# Patient Record
Sex: MALE | Race: BLACK OR AFRICAN AMERICAN | NOT HISPANIC OR LATINO | Employment: OTHER | URBAN - METROPOLITAN AREA
[De-identification: names, ages, dates, MRNs, and addresses within clinical notes are randomized per-mention and may not be internally consistent; named-entity substitution may affect disease eponyms.]

---

## 2017-10-31 ENCOUNTER — HOSPITAL ENCOUNTER (EMERGENCY)
Facility: OTHER | Age: 72
Discharge: HOME | End: 2017-10-31
Attending: EMERGENCY MEDICINE
Payer: MEDICARE

## 2017-10-31 VITALS
DIASTOLIC BLOOD PRESSURE: 63 MMHG | BODY MASS INDEX: 21.2 KG/M2 | HEART RATE: 84 BPM | HEIGHT: 73 IN | TEMPERATURE: 98 F | RESPIRATION RATE: 18 BRPM | OXYGEN SATURATION: 99 % | WEIGHT: 160 LBS | SYSTOLIC BLOOD PRESSURE: 120 MMHG

## 2017-10-31 DIAGNOSIS — Z71.1 PERSON WITH FEARED COMPLAINT, NO DIAGNOSIS MADE: Primary | ICD-10-CM

## 2017-10-31 LAB
ANION GAP SERPL CALC-SCNC: 7 MMOL/L
BASOPHILS # BLD AUTO: 0.02 K/UL
BASOPHILS NFR BLD: 0.2 %
BUN SERPL-MCNC: 20 MG/DL
CALCIUM SERPL-MCNC: 9.2 MG/DL
CHLORIDE SERPL-SCNC: 102 MMOL/L
CO2 SERPL-SCNC: 30 MMOL/L
CREAT SERPL-MCNC: 0.8 MG/DL
DIFFERENTIAL METHOD: ABNORMAL
EOSINOPHIL # BLD AUTO: 0 K/UL
EOSINOPHIL NFR BLD: 0.1 %
ERYTHROCYTE [DISTWIDTH] IN BLOOD BY AUTOMATED COUNT: 12.7 %
EST. GFR  (AFRICAN AMERICAN): >60 ML/MIN/1.73 M^2
EST. GFR  (NON AFRICAN AMERICAN): >60 ML/MIN/1.73 M^2
GLUCOSE SERPL-MCNC: 106 MG/DL
HCT VFR BLD AUTO: 38 %
HGB BLD-MCNC: 12.2 G/DL
LYMPHOCYTES # BLD AUTO: 1.2 K/UL
LYMPHOCYTES NFR BLD: 9.9 %
MCH RBC QN AUTO: 32.3 PG
MCHC RBC AUTO-ENTMCNC: 32.1 G/DL
MCV RBC AUTO: 101 FL
MONOCYTES # BLD AUTO: 0.9 K/UL
MONOCYTES NFR BLD: 7.3 %
NEUTROPHILS # BLD AUTO: 10.2 K/UL
NEUTROPHILS NFR BLD: 81.9 %
PLATELET # BLD AUTO: 301 K/UL
PMV BLD AUTO: 9.1 FL
POTASSIUM SERPL-SCNC: 4.3 MMOL/L
RBC # BLD AUTO: 3.78 M/UL
SODIUM SERPL-SCNC: 139 MMOL/L
WBC # BLD AUTO: 12.39 K/UL

## 2017-10-31 PROCEDURE — 80048 BASIC METABOLIC PNL TOTAL CA: CPT

## 2017-10-31 PROCEDURE — 93005 ELECTROCARDIOGRAM TRACING: CPT

## 2017-10-31 PROCEDURE — 93010 ELECTROCARDIOGRAM REPORT: CPT | Mod: ,,, | Performed by: INTERNAL MEDICINE

## 2017-10-31 PROCEDURE — 99284 EMERGENCY DEPT VISIT MOD MDM: CPT | Mod: 25

## 2017-10-31 PROCEDURE — 85025 COMPLETE CBC W/AUTO DIFF WBC: CPT

## 2017-10-31 NOTE — ED TRIAGE NOTES
Patient stated that last week he was walking around and then woke up on the ground.  Patient denies passing out and does not remember how he got on the floor.  Patient stated that he never felt dizzy or weak.  Patient stated he is only here because his family made him come get checked out.

## 2017-10-31 NOTE — ED PROVIDER NOTES
Encounter Date: 10/31/2017    SCRIBE #1 NOTE: I, Karina Montana , am scribing for, and in the presence of, Dr. Zarate .       History     Chief Complaint   Patient presents with    Fall     Pt reports falling last Friday and relatives want him checked out, denies LOC, no obvious trauma, reports NO pain at this time     Time seen by provider: 6:21 PM    This is a 72 y.o. male who presents to the ED s/p fall. Pt recalls struggling to stand after waking up on the floor four days ago, but is unsure of how he got there. The patient reports chronic vision changes to the right eye, but denies fever, chills, nausea, vomiting, blood in stool, palpitations, gait problem, dizziness, light-headedness, back pain, neck pain, or any urinary symptoms.       The history is provided by the patient.     Review of patient's allergies indicates:  No Known Allergies  Past Medical History:   Diagnosis Date    Anxiety 10/5/2014    Arthritis 10/5/2014    Chronic back pain 11/26/2015    Elevated PSA 11/7/2012    Fatigue 11/7/2012    GERD (gastroesophageal reflux disease) 10/5/2014    Iron deficiency anemia, unspecified 11/7/2012    Neurofibromatosis, type 1 (von Recklinghausen's disease) 11/7/2012    Prostate cancer 11/7/2012    Slow transit constipation 4/24/2016     Past Surgical History:   Procedure Laterality Date    RECTAL POLYPECTOMY  2011     History reviewed. No pertinent family history.  Social History   Substance Use Topics    Smoking status: Never Smoker    Smokeless tobacco: Not on file    Alcohol use No     Review of Systems   Constitutional: Negative for activity change, appetite change, chills, diaphoresis and fever.   HENT: Negative for congestion, sore throat and trouble swallowing.    Eyes: Positive for visual disturbance (chronic ). Negative for photophobia.   Respiratory: Negative for cough, chest tightness and shortness of breath.    Cardiovascular: Negative for chest pain and palpitations.    Gastrointestinal: Negative for abdominal pain, blood in stool, nausea and vomiting.   Endocrine: Negative for polydipsia, polyphagia and polyuria.   Genitourinary: Negative for decreased urine volume, difficulty urinating, dysuria, flank pain, frequency, hematuria and urgency.   Musculoskeletal: Negative for back pain, gait problem and neck pain.   Skin: Negative for pallor.   Neurological: Negative for dizziness, weakness, light-headedness and headaches.   Psychiatric/Behavioral: Negative for confusion.       Physical Exam     Initial Vitals [10/31/17 1756]   BP Pulse Resp Temp SpO2   117/65 98 17 98.4 °F (36.9 °C) 99 %      MAP       82.33         Physical Exam    Nursing note and vitals reviewed.  Constitutional: He appears well-developed and well-nourished. He is not diaphoretic. No distress.   HENT:   Head: Normocephalic and atraumatic.   Right Ear: External ear normal.   Left Ear: External ear normal.   Eyes: Right eye exhibits no discharge. Left eye exhibits no discharge.   No conjunctival pallor. Right eye is cloudy.    Neck: Normal range of motion. Neck supple. No tracheal deviation present.   Cardiovascular: Normal rate, regular rhythm, normal heart sounds and intact distal pulses. Exam reveals no gallop and no friction rub.    No murmur heard.  Pulmonary/Chest: Breath sounds normal. No stridor. No respiratory distress. He has no wheezes. He has no rhonchi. He has no rales.   Abdominal: Soft. Bowel sounds are normal. He exhibits no distension. There is no tenderness. There is no rebound and no guarding.   Musculoskeletal: Normal range of motion. He exhibits no edema or tenderness.   Neurological: He is alert and oriented to person, place, and time. He has normal strength. No cranial nerve deficit.   Skin: Skin is warm and dry. Capillary refill takes less than 2 seconds. No erythema. No pallor.   Extensive evidence of neurofibromatosis. Skin lesions.    Psychiatric: He has a normal mood and affect.  Thought content normal.         ED Course   Procedures  Labs Reviewed   CBC W/ AUTO DIFFERENTIAL - Abnormal; Notable for the following:        Result Value    RBC 3.78 (*)     Hemoglobin 12.2 (*)     Hematocrit 38.0 (*)      (*)     MCH 32.3 (*)     MPV 9.1 (*)     Gran # 10.2 (*)     Gran% 81.9 (*)     Lymph% 9.9 (*)     All other components within normal limits   BASIC METABOLIC PANEL - Abnormal; Notable for the following:     CO2 30 (*)     Anion Gap 7 (*)     All other components within normal limits     EKG Readings: (Independently Interpreted)   Initial Reading: No STEMI.   Normal sinus rhythm at a rate of 90 bpm. Normal axis.           Medical Decision Making:   Initial Assessment:   Urgent evaluation of 73 yo M with neurofibramatosis and extensive skin nodules here for evaluation on encouragement of family after awakening on the floor 4 days previously. Pt reports associated weakness with struggle to ambulate thereafter, but improved to baseline quickly. Since that time pt has had no complaints, no dizziness, blood in stool, difficulty walking/speaking, vomiting or headache. On exam pt has no evidence of trauma, no tongue laceration to suggest seizure, and nml neurologic exam w chronic unchanged R eye opacity w light perception at baseline. Broad ddx w low suspicion for serious pathology or IC injury/mass, will eval for anemia, electrolyte disturbance and likely dc home w fu PCP.   Clinical Tests:   Lab Tests: Ordered and Reviewed  Medical Tests: Ordered and Reviewed                   ED Course      Clinical Impression:     1. Person with feared complaint, no diagnosis made          Disposition:   Disposition: Discharged  Condition: Stable                        Savannah Zarate MD  10/31/17 2038

## 2017-11-13 ENCOUNTER — PATIENT OUTREACH (OUTPATIENT)
Dept: INTERNAL MEDICINE | Facility: CLINIC | Age: 72
End: 2017-11-13

## 2017-11-13 NOTE — PROGRESS NOTES
[facility] is committed to your overall health.  To help you get the most out of each of your visits, we will review your information to make sure you are up to date on all of your recommended tests and/or procedures.       Your PCP  Raphael Catherine MD   found that you may be due for:     Health Maintenance Due   Topic Date Due    Zoster Vaccine  08/17/2003    Pneumococcal (65+) (1 of 2 - PCV13) 08/17/2008    Influenza Vaccine  08/01/2017    PROSTATE-SPECIFIC ANTIGEN  10/03/2017    Lipid Panel  10/03/2017     Medicare does not cover all immunizations to be given in the clinic. Check your benefits to ensure that you do not need to receive your immunizations at the pharmacy.  You are more than welcome to visit our pharmacy here on campus to receive your vaccinations on the same day of your upcoming scheduled visit.             If you have had any of the above done at another facility, please bring the records or information with you so that your record at [facility] will be complete.  If you would like to schedule any of these, please contact me.     If you are currently taking medication, please bring it with you to your appointment for review.     Also, if you have any type of Advanced Directives, please bring them with you to your office visit so we may scan them into your chart.     Thank you for Choosing [facility] for your healthcare needs.      Additional Information  If you have questions, you can email [email or web address] or call [phone]  to talk to our [facility] staff. Remember, [facility] is NOT to be used for urgent needs. For medical emergencies, dial 911.

## 2017-12-05 ENCOUNTER — OFFICE VISIT (OUTPATIENT)
Dept: INTERNAL MEDICINE | Facility: CLINIC | Age: 72
End: 2017-12-05
Attending: INTERNAL MEDICINE
Payer: MEDICARE

## 2017-12-05 ENCOUNTER — HOSPITAL ENCOUNTER (OUTPATIENT)
Dept: RADIOLOGY | Facility: OTHER | Age: 72
Discharge: HOME | End: 2017-12-05
Attending: INTERNAL MEDICINE
Payer: MEDICARE

## 2017-12-05 VITALS
HEART RATE: 73 BPM | BODY MASS INDEX: 20.37 KG/M2 | WEIGHT: 153.69 LBS | DIASTOLIC BLOOD PRESSURE: 66 MMHG | HEIGHT: 73 IN | SYSTOLIC BLOOD PRESSURE: 130 MMHG

## 2017-12-05 DIAGNOSIS — R55 SYNCOPE AND COLLAPSE: ICD-10-CM

## 2017-12-05 DIAGNOSIS — R97.20 ELEVATED PSA: Primary | ICD-10-CM

## 2017-12-05 PROCEDURE — 93880 EXTRACRANIAL BILAT STUDY: CPT | Mod: TC

## 2017-12-05 PROCEDURE — 70450 CT HEAD/BRAIN W/O DYE: CPT | Mod: 26,,, | Performed by: RADIOLOGY

## 2017-12-05 PROCEDURE — 93880 EXTRACRANIAL BILAT STUDY: CPT | Mod: 26,,, | Performed by: RADIOLOGY

## 2017-12-05 PROCEDURE — 70450 CT HEAD/BRAIN W/O DYE: CPT | Mod: TC

## 2017-12-05 PROCEDURE — 99999 PR PBB SHADOW E&M-EST. PATIENT-LVL IV: CPT | Mod: PBBFAC,,, | Performed by: INTERNAL MEDICINE

## 2017-12-05 PROCEDURE — 99214 OFFICE O/P EST MOD 30 MIN: CPT | Mod: S$GLB,,, | Performed by: INTERNAL MEDICINE

## 2017-12-05 NOTE — PROGRESS NOTES
Subjective:       Patient ID: Ishan Moulton is a 72 y.o. male.    Chief Complaint: Hospital Follow Up    Here for ED f/u    Recent syncope event. Pt denies any symptoms prior to going to bed that evening and feeling well. The next morning he awoke on the floor with weakness of lower body and had to use his upper body to pull himself off the floor. He does not describe focal weakness of legs but simply difficulty getting off the floor. He denies any confusion up awakening and knew where he was but has no recollection of awakening and attempting to get out of bed. He denies loss of bowel or bladder. He denies any focal weakness, numbness/tingling, tremors, HA, neck pain, chronic neck pain, prior episodes of dizziness, recent CP, recent palpitations, fatigue, F/C, abd pain, blurry vision, vertigo, recent GI illness. Symptoms have completed resolved and have not reoccured. He denies any new symptoms since event and feels well. He consumes very little water throughout the day. He denies EtOH use.  He reports he has a high bed and occasionally will accidentally slide downout of bed when getting attempting to get down. Takes B12 , but no Hx of B12 deficiency.          Review of Systems   Constitutional: Negative for appetite change, chills, fever and unexpected weight change.   HENT: Negative for hearing loss, sore throat and trouble swallowing.    Eyes: Negative for visual disturbance.   Respiratory: Negative for cough, chest tightness and shortness of breath.    Cardiovascular: Negative for chest pain and leg swelling.   Gastrointestinal: Negative for abdominal pain, blood in stool, constipation, diarrhea, nausea and vomiting.   Endocrine: Negative for polydipsia and polyuria.   Genitourinary: Negative for decreased urine volume, difficulty urinating, dysuria, frequency and urgency.   Musculoskeletal: Negative for gait problem.   Skin: Negative for rash.   Neurological: Negative for dizziness and numbness.  "  Psychiatric/Behavioral: The patient is not nervous/anxious.        Objective:      Vitals:    12/05/17 0847   BP: 130/66   Pulse: 73   Weight: 69.7 kg (153 lb 10.6 oz)   Height: 6' 1" (1.854 m)      Physical Exam   Constitutional: He is oriented to person, place, and time. He appears well-developed and well-nourished. No distress.   HENT:   Head: Normocephalic and atraumatic.   Mouth/Throat: Oropharynx is clear and moist. No oropharyngeal exudate.   Eyes: Conjunctivae and EOM are normal. Pupils are equal, round, and reactive to light. No scleral icterus.   Neck: No thyromegaly present.   Cardiovascular: Normal rate, regular rhythm and normal heart sounds.    No murmur heard.  Pulses:       Carotid pulses are 2+ on the right side, and 2+ on the left side.  Pulmonary/Chest: Effort normal and breath sounds normal. He has no wheezes. He has no rales.   Abdominal: Soft. He exhibits no distension. There is no tenderness.   Musculoskeletal: He exhibits no edema or tenderness.   Lymphadenopathy:     He has no cervical adenopathy.   Neurological: He is alert and oriented to person, place, and time. He has normal strength. No cranial nerve deficit (right eye not evaluated) or sensory deficit. He displays a negative Romberg sign. Gait normal.   Negative pronator drift   Skin: Skin is warm and dry.   Psychiatric: He has a normal mood and affect. His behavior is normal.       Assessment:       1. Elevated PSA    2. Syncope and collapse        Plan:       Ishan was seen today for hospital follow up.    Diagnoses and all orders for this visit:    Elevated PSA  -     PROSTATE SPECIFIC ANTIGEN, DIAGNOSTIC; Future    Syncope and collapse  -syncopal event while getting out of bed? TIA can not be ruled out. Arrhythmia can not be ruled out.  -     Radiology US Carotid Bilateral; Future  -     2D echo with color flow doppler; Future  -     CT Head Without Contrast; Future  -     Ambulatory Referral to Cardiology    Return after " testing and cardiac eval             Side effects of medication(s) were discussed in detail and patient voiced understanding.  Patient will call back for any issues or complications.

## 2017-12-06 ENCOUNTER — TELEPHONE (OUTPATIENT)
Dept: INTERNAL MEDICINE | Facility: CLINIC | Age: 72
End: 2017-12-06

## 2017-12-06 DIAGNOSIS — R97.20 PSA ELEVATION: ICD-10-CM

## 2017-12-06 DIAGNOSIS — R55 SYNCOPE, UNSPECIFIED SYNCOPE TYPE: Primary | ICD-10-CM

## 2017-12-06 NOTE — TELEPHONE ENCOUNTER
Please let pt know his PSA remains elevated at 5.8 and that he needs to schedule f/u with his urologist Dr. Mario Alberto Rivera and if okay with him let's fax those results to his urologist.  Also in regards to his recent visit I would like for him to see neurology for additional evaluation of his recent event.

## 2017-12-06 NOTE — TELEPHONE ENCOUNTER
Pt informed of results and advice.  States verbal understanding. Pt appt slips mailed. Patient has no further questions or concerns.  Pt states he no longer wants to see Dr. Rivera.   Pt request to see internal urologist. Pt scheduled accordingly. Please sign pended order.

## 2017-12-12 ENCOUNTER — HOSPITAL ENCOUNTER (OUTPATIENT)
Dept: CARDIOLOGY | Facility: CLINIC | Age: 72
Discharge: HOME | End: 2017-12-12
Attending: INTERNAL MEDICINE
Payer: MEDICARE

## 2017-12-12 DIAGNOSIS — R55 SYNCOPE AND COLLAPSE: ICD-10-CM

## 2017-12-12 LAB
DIASTOLIC DYSFUNCTION: NO
ESTIMATED PA SYSTOLIC PRESSURE: 17.14
MITRAL VALVE REGURGITATION: NORMAL
RETIRED EF AND QEF - SEE NOTES: 60 (ref 55–65)
TRICUSPID VALVE REGURGITATION: NORMAL

## 2017-12-12 PROCEDURE — 93306 TTE W/DOPPLER COMPLETE: CPT | Mod: S$GLB,,, | Performed by: INTERNAL MEDICINE

## 2017-12-14 ENCOUNTER — TELEPHONE (OUTPATIENT)
Dept: INTERNAL MEDICINE | Facility: CLINIC | Age: 72
End: 2017-12-14

## 2018-01-04 ENCOUNTER — TELEPHONE (OUTPATIENT)
Dept: INTERNAL MEDICINE | Facility: CLINIC | Age: 73
End: 2018-01-04

## 2018-01-04 ENCOUNTER — OFFICE VISIT (OUTPATIENT)
Dept: UROLOGY | Facility: CLINIC | Age: 73
End: 2018-01-04
Attending: INTERNAL MEDICINE
Payer: MEDICARE

## 2018-01-04 VITALS
BODY MASS INDEX: 19.72 KG/M2 | HEART RATE: 90 BPM | WEIGHT: 148.81 LBS | SYSTOLIC BLOOD PRESSURE: 106 MMHG | HEIGHT: 73 IN | DIASTOLIC BLOOD PRESSURE: 64 MMHG

## 2018-01-04 DIAGNOSIS — R97.20 ELEVATED PSA: Primary | ICD-10-CM

## 2018-01-04 LAB
BILIRUB SERPL-MCNC: NORMAL MG/DL
BLOOD URINE, POC: NORMAL
COLOR, POC UA: YELLOW
GLUCOSE UR QL STRIP: NORMAL
KETONES UR QL STRIP: NORMAL
LEUKOCYTE ESTERASE URINE, POC: NORMAL
NITRITE, POC UA: NORMAL
PH, POC UA: 6
POC RESIDUAL URINE VOLUME: 64 ML (ref 0–100)
PROTEIN, POC: NORMAL
SPECIFIC GRAVITY, POC UA: 1
UROBILINOGEN, POC UA: NORMAL

## 2018-01-04 PROCEDURE — 81002 URINALYSIS NONAUTO W/O SCOPE: CPT | Mod: S$GLB,,, | Performed by: UROLOGY

## 2018-01-04 PROCEDURE — 51798 US URINE CAPACITY MEASURE: CPT | Mod: S$GLB,,, | Performed by: UROLOGY

## 2018-01-04 PROCEDURE — 99204 OFFICE O/P NEW MOD 45 MIN: CPT | Mod: 25,S$GLB,, | Performed by: UROLOGY

## 2018-01-04 NOTE — PROGRESS NOTES
"Subjective:      Ishan Moulton is a 72 y.o. male who was referred by Raphael Catherine MD  for evaluation of elevated PSA.      Elevated PSA  Patient is here with an elevated PSA. He has no family history of prostate cancer. His AUA Symptom Score is 9/2. He has no prior genitourinary history of hematuria, prostatitis, UTI.    The following portions of the patient's history were reviewed and updated as appropriate: allergies, current medications, past family history, past medical history, past social history, past surgical history and problem list.    Review of Systems  Constitutional: no fever or chills  ENT: no nasal congestion or sore throat  Respiratory: no cough or shortness of breath  Cardiovascular: no chest pain or palpitations  Gastrointestinal: no nausea or vomiting, tolerating diet  Genitourinary: as per HPI  Hematologic/Lymphatic: no easy bruising or lymphadenopathy  Musculoskeletal: no arthralgias or myalgias  Neurological: no seizures or tremors  Behavioral/Psych: no auditory or visual hallucinations     Objective:   Vitals: /64 (BP Location: Left arm, Patient Position: Sitting, BP Method: Large (Automatic))   Pulse 90   Ht 6' 1" (1.854 m)   Wt 67.5 kg (148 lb 13 oz)   BMI 19.63 kg/m²     Physical Exam   General: alert and oriented, no acute distress  Head: normocephalic, atraumatic  Neck: supple, no lymphadenopathy, normal ROM, no masses  Respiratory: Symmetric expansion, non-labored breathing  Cardiovascular: regular rate and rhythm, nomal pulses, no peripheral edema  Neuro: alert and oriented x3, no gross deficits  Psych: normal judgment and insight, normal mood/affect and non-anxious    Bladder Scan PVR: 64cc      Lab Review   Urinalysis demonstrates negative for all components  Lab Results   Component Value Date    WBC 12.39 10/31/2017    HGB 12.2 (L) 10/31/2017    HCT 38.0 (L) 10/31/2017     (H) 10/31/2017     10/31/2017     Lab Results   Component Value Date    CREATININE " 0.8 10/31/2017    BUN 20 10/31/2017     Component Total PSA Free PSA % Free PSA   Latest Ref Rng & Units < OR = 4.0 ng/mL ng/mL >25 % (calc)   12/5/2017 5.8 (H)     10/3/2016 6.1 (H) 1.5 25 (L)   3/22/2016 6.5 (H) 1.4 22 (L)   3/27/2015 5.4 (H) 1.4 26   3/10/2014 4.3 (H)     4/17/2013 6.8 (H)         Assessment:     1. Elevated PSA        Plan:   Discussed PSA with patient and reviewed above history. Overall, PSA has been stable since 2013, mostly between 5.4 and 6.8, with high % free PSA. As such, risk for malignancy and in particular high grade malignancy is relatively low. Therefore I think biopsy can be deferred at this time in favor of continued observation with repeat PSA in 6 mos. He agrees to plan.    I spent over 45 minutes with the patient. Over 50% of the visit was spent in counseling and coordination of care.

## 2018-01-04 NOTE — TELEPHONE ENCOUNTER
----- Message from Rachel España sent at 1/3/2018  3:06 PM CST -----  Contact: self  x_  1st Request  _  2nd Request  _  3rd Request    Who: pt    Why: pt returning call in regards to referral appt..please advise    What Number to Call Back: [phone]    When to Expect a call back: (Before the end of the day)   -- if call after 3:00 call back will be tomorrow.

## 2018-01-09 ENCOUNTER — OFFICE VISIT (OUTPATIENT)
Dept: CARDIOLOGY | Facility: CLINIC | Age: 73
End: 2018-01-09
Payer: MEDICARE

## 2018-01-09 VITALS
BODY MASS INDEX: 20.28 KG/M2 | HEIGHT: 72 IN | DIASTOLIC BLOOD PRESSURE: 66 MMHG | WEIGHT: 149.69 LBS | SYSTOLIC BLOOD PRESSURE: 129 MMHG | HEART RATE: 79 BPM

## 2018-01-09 DIAGNOSIS — R55 SYNCOPE, UNSPECIFIED SYNCOPE TYPE: Primary | ICD-10-CM

## 2018-01-09 DIAGNOSIS — Q85.00 NEUROFIBROMATOSIS (NONMALIGNANT): ICD-10-CM

## 2018-01-09 PROCEDURE — 99204 OFFICE O/P NEW MOD 45 MIN: CPT | Mod: S$GLB,,, | Performed by: INTERNAL MEDICINE

## 2018-01-09 PROCEDURE — 99999 PR PBB SHADOW E&M-EST. PATIENT-LVL III: CPT | Mod: PBBFAC,,, | Performed by: INTERNAL MEDICINE

## 2018-01-09 NOTE — LETTER
January 9, 2018      Raphael Catherine MD  [address]  [address]  [facility] [address]           [facility]  [address]  [place] [address]  Phone: [phone]          Patient: Ishan Moulton   MR Number: [id number]   YOB: 1945   Date of Visit: 1/9/2018       Dear Dr. Raphael Catherine:    Thank you for referring Ishan Moulton to me for evaluation. Attached you will find relevant portions of my assessment and plan of care.    If you have questions, please do not hesitate to call me. I look forward to following Ishan Moulton along with you.    Sincerely,    Geremias Manzano MD    Enclosure  CC:  No Recipients    If you would like to receive this communication electronically, please contact [email or web address] or [phone] to request more information on [facility] access.    For providers and/or their staff who would like to refer a patient to [facility], please contact us through our one-stop-shop provider referral line, [facility] , at [phone].    If you feel you have received this communication in error or would no longer like to receive these types of communications, please e-mail [email or web address]

## 2018-01-15 ENCOUNTER — OFFICE VISIT (OUTPATIENT)
Dept: NEUROLOGY | Facility: CLINIC | Age: 73
End: 2018-01-15
Attending: INTERNAL MEDICINE
Payer: MEDICARE

## 2018-01-15 VITALS
BODY MASS INDEX: 20.1 KG/M2 | DIASTOLIC BLOOD PRESSURE: 67 MMHG | SYSTOLIC BLOOD PRESSURE: 103 MMHG | HEIGHT: 72 IN | HEART RATE: 106 BPM | WEIGHT: 148.38 LBS

## 2018-01-15 DIAGNOSIS — R55 SYNCOPE, UNSPECIFIED SYNCOPE TYPE: ICD-10-CM

## 2018-01-15 DIAGNOSIS — F41.9 ANXIETY: ICD-10-CM

## 2018-01-15 DIAGNOSIS — R55 TRANSIENT LOSS OF CONSCIOUSNESS: Primary | ICD-10-CM

## 2018-01-15 DIAGNOSIS — Q85.01 NEUROFIBROMATOSIS, TYPE 1 (VON RECKLINGHAUSEN'S DISEASE): ICD-10-CM

## 2018-01-15 PROCEDURE — 99999 PR PBB SHADOW E&M-EST. PATIENT-LVL III: CPT | Mod: PBBFAC,,, | Performed by: PSYCHIATRY & NEUROLOGY

## 2018-01-15 PROCEDURE — 99204 OFFICE O/P NEW MOD 45 MIN: CPT | Mod: S$GLB,,, | Performed by: PSYCHIATRY & NEUROLOGY

## 2018-01-15 NOTE — PATIENT INSTRUCTIONS
The patient history of syncope may be related to orthostatic hypotension.  He does report that he does not drink enough fluid and past would skip breakfast in the mornings.  A CT scan of the brain and carotid ultrasound done showed age-related changes but was otherwise unremarkable.  Will get an EEG done to rule out the remote possibility of a paroxysmal disorder.  If this is unremarkable he will continue follow-up with his primary care physician.  His blood pressures have been running low and recently and his blood pressure today was 103/67.  Adequate hydration and avoiding skipping breakfast is recommended.

## 2018-01-22 ENCOUNTER — HOSPITAL ENCOUNTER (OUTPATIENT)
Dept: NEUROLOGY | Facility: CLINIC | Age: 73
Discharge: HOME | End: 2018-01-22
Payer: MEDICARE

## 2018-01-22 DIAGNOSIS — R55 TRANSIENT LOSS OF CONSCIOUSNESS: ICD-10-CM

## 2018-01-22 DIAGNOSIS — R55 SYNCOPE, UNSPECIFIED SYNCOPE TYPE: ICD-10-CM

## 2018-01-22 PROCEDURE — 95816 PR EEG,W/AWAKE & DROWSY RECORD: ICD-10-PCS | Mod: S$GLB,,, | Performed by: PSYCHIATRY & NEUROLOGY

## 2018-01-22 PROCEDURE — 95816 EEG AWAKE AND DROWSY: CPT | Mod: S$GLB,,, | Performed by: PSYCHIATRY & NEUROLOGY

## 2018-01-23 NOTE — PROCEDURES
DATE OF STUDY:  01/22/2018    EEG NUMBER:  OC-.    REFERRING PHYSICIAN:  Danyel Patterson M.D.    This EEG was performed to assess for evidence of underlying epilepsy.    ELECTROENCEPHALOGRAM REPORT    METHODOLOGY:  Electroencephalographic (EEG) recording is recorded with   electrodes placed according to the International 10-20 placement system.  Thirty   two (32) channels of digital signal (sampling rate of 512/sec), including T1   and T2, were simultaneously recorded from the scalp and may include EKG, EMG,   and/or eye monitors.  Recording band pass was 0.1 to 512 Hz.  Digital video   recording of the patient is simultaneously recorded with the EEG.  The patient   is instructed to report clinical symptoms which may occur during the recording   session.  EEG and video recording are stored and archived in digital format.    Activation procedures, which include photic stimulation, hyperventilation and   instructing patients to perform simple tasks, are done in selected patients.    The EEG is displayed on a monitor screen and can be reviewed using different   montages.  Computer assisted-analysis is employed to detect spike and   electrographic seizure activity.  The entire record is submitted for computer   analysis.  The entire recording is visually reviewed, and the times identified   by computer analysis as being spikes or seizures are reviewed again.    Compressed spectral analysis (CSA) is also performed on the activity recorded   from each individual channel.  This is displayed as a power display of   frequencies from 0 to 30 Hz over time.  The CSA is reviewed looking for   asymmetries in power between homologous areas of the scalp, then compared with   the original EEG recording.    [employer] software was also utilized in the review of this study.  This software   suite analyzes the EEG recording in multiple domains.  Coherence and rhythmicity   are computed to identify EEG sections which may contain  organized seizures.    Each channel undergoes analysis to detect the presence of spike and sharp waves   which have special and morphological characteristics of epileptic activity.  The   routine EEG recording is converted from special into frequency domain.  This is   then displayed comparing homologous areas to identify areas of significant   asymmetry.  Algorithm to identify non-cortically generated artifact is used to   separate artifact from the EEG.    EEG FINDINGS:  The recording was obtained with a number of standard bipolar and   referential montages during wakefulness and drowsiness.  In the alert state, the   posterior background rhythm was a symmetric, well-modulated 9 Hz to 10 Hz alpha   rhythm, which reacted symmetrically to eye opening.  Intermittent photic   stimulation evoked symmetric posterior driving responses.  Hyperventilation was   not performed.  No abnormalities were activated by photic stimulation.  During   drowsiness, the background rhythm waxed and waned and there were periods of   slowing.  Independent left and right temporal focal polymorphic delta and theta   range slowing was noted, worse on the left.  No sleep was recorded.  There were   no interictal epileptiform abnormalities and no clinical or electrographic   seizures were recorded.    The EKG channel revealed sinus rhythm.    IMPRESSION:  This is an abnormal EEG during wakefulness and drowsiness.    Independent left and right temporal focal slowing was noted.    CLINICAL CORRELATION:  The patient is a 72-year-old male who is being evaluated   for a history of syncope.  This is an abnormal EEG during wakefulness and   drowsiness.  The presence of independent left and right temporal focal slowing   is suggestive of focal neuronal dysfunction in these regions.  There is no   evidence of an epileptic process on this recording.  No seizures were recorded   during this study.  The EKG channel revealed sinus rhythm.      FAK/IN  dd:  01/23/2018 09:06:05 (CST)  td: 01/23/2018 09:40:57 (CST)  Doc ID   #[id number]  Job ID #[id number]    CC:

## 2019-02-04 ENCOUNTER — HOSPITAL ENCOUNTER (EMERGENCY)
Facility: OTHER | Age: 74
Discharge: HOME | End: 2019-02-04
Attending: EMERGENCY MEDICINE
Payer: MEDICARE

## 2019-02-04 VITALS
TEMPERATURE: 98 F | SYSTOLIC BLOOD PRESSURE: 120 MMHG | RESPIRATION RATE: 16 BRPM | WEIGHT: 145 LBS | HEART RATE: 87 BPM | BODY MASS INDEX: 19.22 KG/M2 | DIASTOLIC BLOOD PRESSURE: 59 MMHG | HEIGHT: 73 IN | OXYGEN SATURATION: 100 %
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
## 2019-02-04 DIAGNOSIS — S01.112A LACERATION OF LEFT EYEBROW, INITIAL ENCOUNTER: Primary | ICD-10-CM

## 2019-02-04 PROCEDURE — 25000003 PHARM REV CODE 250: Performed by: PHYSICIAN ASSISTANT

## 2019-02-04 PROCEDURE — 63600175 PHARM REV CODE 636 W HCPCS: Performed by: PHYSICIAN ASSISTANT

## 2019-02-04 PROCEDURE — 90471 IMMUNIZATION ADMIN: CPT | Performed by: PHYSICIAN ASSISTANT

## 2019-02-04 PROCEDURE — 90715 TDAP VACCINE 7 YRS/> IM: CPT | Performed by: PHYSICIAN ASSISTANT

## 2019-02-04 PROCEDURE — 12011 RPR F/E/E/N/L/M 2.5 CM/<: CPT

## 2019-02-04 PROCEDURE — 99284 EMERGENCY DEPT VISIT MOD MDM: CPT | Mod: 25

## 2019-02-04 RX ORDER — LIDOCAINE HYDROCHLORIDE AND EPINEPHRINE 10; 10 MG/ML; UG/ML
10 INJECTION, SOLUTION INFILTRATION; PERINEURAL
Status: COMPLETED | OUTPATIENT
Start: 2019-02-04 | End: 2019-02-04

## 2019-02-04 RX ADMIN — LIDOCAINE HYDROCHLORIDE,EPINEPHRINE BITARTRATE 10 ML: 10; .01 INJECTION, SOLUTION INFILTRATION; PERINEURAL at 10:02

## 2019-02-04 RX ADMIN — CLOSTRIDIUM TETANI TOXOID ANTIGEN (FORMALDEHYDE INACTIVATED), CORYNEBACTERIUM DIPHTHERIAE TOXOID ANTIGEN (FORMALDEHYDE INACTIVATED), BORDETELLA PERTUSSIS TOXOID ANTIGEN (GLUTARALDEHYDE INACTIVATED), BORDETELLA PERTUSSIS FILAMENTOUS HEMAGGLUTININ ANTIGEN (FORMALDEHYDE INACTIVATED), BORDETELLA PERTUSSIS PERTACTIN ANTIGEN, AND BORDETELLA PERTUSSIS FIMBRIAE 2/3 ANTIGEN 0.5 ML: 5; 2; 2.5; 5; 3; 5 INJECTION, SUSPENSION INTRAMUSCULAR at 10:02

## 2019-02-04 NOTE — ED NOTES
Pt to ED, reporting slipping on uneven carpet, reporting he hit above his L eyelid. Pt with approx 2.5 cm laceration above L upper eyelid, near distal eyebrow. No active bleeding. Reporting incident occurred last PM around 8 pm. Denies LOC, unsure if he is UTD on tetanus vaccine. Pt AAOx4 and appropriate at this time. Respirations even and unlabored. No acute distress noted.

## 2019-02-04 NOTE — ED PROVIDER NOTES
Encounter Date: 2/4/2019       History     Chief Complaint   Patient presents with    Laceration     Pt states that he tripped and fell causing a lac above his left eye. He denies any LOC.      Patient is a 73 y.o. male with a past medical history of neurofibromatosis, presenting to the emergency department with complaints of laceration to his left eyebrow.  The patient states that yesterday evening at approximately 8:00 p.m. he was walking when he tripped on some carpet and fell forward.  He states that he cut his left eyebrow.  He reports he has some bleeding and 1 of the nurses that charge tried to put some tape on it.  He denies any changes in his vision.  He denies any loss of consciousness.  No nausea or vomiting. He states he is not up-to-date on tetanus.  No medication use thus far. This is the extent of the patient's complaints at this time.         The history is provided by the patient.     Review of patient's allergies indicates:  No Known Allergies  Past Medical History:   Diagnosis Date    Anxiety 10/5/2014    Arthritis 10/5/2014    Chronic back pain 11/26/2015    Elevated PSA 11/7/2012    Fatigue 11/7/2012    GERD (gastroesophageal reflux disease) 10/5/2014    Iron deficiency anemia, unspecified 11/7/2012    Neurofibromatosis, type 1 (von Recklinghausen's disease) 11/7/2012    Prostate cancer 11/7/2012    Slow transit constipation 4/24/2016     Past Surgical History:   Procedure Laterality Date    RECTAL POLYPECTOMY  2011     History reviewed. No pertinent family history.  Social History     Tobacco Use    Smoking status: Never Smoker    Smokeless tobacco: Never Used   Substance Use Topics    Alcohol use: No    Drug use: Not on file     Review of Systems   Constitutional: Negative for activity change, chills, fatigue and fever.   HENT: Negative for congestion, rhinorrhea and sore throat.    Eyes: Negative for photophobia and visual disturbance.   Respiratory: Negative for cough and  shortness of breath.    Cardiovascular: Negative for chest pain.   Gastrointestinal: Negative for abdominal pain, diarrhea, nausea and vomiting.   Genitourinary: Negative for dysuria, hematuria and urgency.   Musculoskeletal: Negative for back pain, myalgias and neck pain.   Skin: Positive for wound. Negative for color change.   Neurological: Negative for weakness and headaches.   Psychiatric/Behavioral: Negative for agitation and confusion.       Physical Exam     Initial Vitals [02/04/19 1028]   BP Pulse Resp Temp SpO2   (!) 129/59 92 18 98.1 °F (36.7 °C) 100 %      MAP       --         Physical Exam    Nursing note and vitals reviewed.  Constitutional: Vital signs are normal. He appears well-developed and well-nourished. He is not diaphoretic.  Non-toxic appearance. He does not have a sickly appearance. He does not appear ill. No distress.   Well-appearing,  male unaccompanied in the emergency department.  Speaking clearly in full sentences.  No acute distress.   HENT:   Head: Normocephalic and atraumatic.       Right Ear: External ear normal.   Left Ear: External ear normal.   Nose: Nose normal.   Mouth/Throat: Oropharynx is clear and moist.   Ecchymosis noted to the left upper eyelid with no tenderness to palpation. Normal extraocular movement.   Diffuse, neurofibromatosis tumors to the face   Eyes: Conjunctivae and EOM are normal.   Neck: Normal range of motion. Neck supple.   Musculoskeletal: Normal range of motion.   Neurological: He is alert and oriented to person, place, and time. No cranial nerve deficit or sensory deficit. GCS eye subscore is 4. GCS verbal subscore is 5. GCS motor subscore is 6.   Skin: Skin is warm.   Psychiatric: He has a normal mood and affect. His behavior is normal. Judgment and thought content normal.         ED Course   Lac Repair  Date/Time: 2/4/2019 11:53 AM  Performed by: Rosita France PA-C  Authorized by: Ira Huntley MD   Body area: head/neck  Location  details: left eyebrow  Laceration length: 2 cm  Tendon involvement: none  Nerve involvement: none  Skin closure: glue and Steri-Strips  Technique: simple  Approximation: close  Patient tolerance: Patient tolerated the procedure well with no immediate complications        Labs Reviewed - No data to display       Imaging Results          CT Head Without Contrast (Final result)  Result time 02/04/19 13:12:18    Final result by Imtiaz Harrison MD (02/04/19 13:12:18)                 Impression:      1. No acute intracranial abnormality identified.  2. Age-related mild senescent changes as above.  3. Right thalamic suspected remote lacunar type infarct, unchanged.  4. Left periorbital soft tissue swelling/contusion, without maxillofacial acute displaced fracture-dislocation identified.  5. 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  6. Innumerable subcutaneous nodules consistent with neurofibromatosis type 1.  7. Numerous scattered small lytic appearing foci throughout the cervical spine.  If clinical concern for osseous lytic metastases, further evaluation with elective/nonemergent bone scan can be obtained as warranted in the management of this patient.      Electronically signed by: Imtiaz Harrison MD  Date:    02/04/2019  Time:    13:12             Narrative:    EXAMINATION:  CT HEAD WITHOUT CONTRAST; CT MAXILLOFACIAL WITHOUT CONTRAST    CLINICAL HISTORY:  Maxface trauma blunt;    TECHNIQUE:  Low dose axial CT images obtained throughout the head and maxillofacial region without intravenous contrast. Sagittal and coronal reconstructions were performed.    COMPARISON:  Head CT 12/05/2017    FINDINGS:  Head CT:    Intracranial compartment:    Age-appropriate generalized cerebral volume loss.  Ventricles are midline and stable in size configuration without distortion  by mass effect or acute hydrocephalus.  No extra-axial blood or fluid collections.    Grossly stable distribution of supratentorial white matter chronic small vessel ischemic change.  Right thalamic suspected remote lacunar type infarct, unchanged.  No definite new focal areas of abnormal parenchymal attenuation.  Stable small nonspecific parenchymal calcification within the left cerebellum.  No parenchymal mass, hemorrhage, edema or major vascular distribution infarct.  Calcific atherosclerosis of the bilateral cavernous ICAs.    Skull/extracranial contents (limited evaluation): No displaced skull fracture.    Maxillofacial CT: Interval left periorbital mild soft tissue swelling/contusion.    The greater wing of the sphenoid bone on the right and also posterior aspect of the right orbital roof appear to be absent similar to prior, which could be from dysplasia or prior resection.  There is abnormal increased soft tissue in the preseptal aspect of the right orbit as well as extending more posteriorly along the superior orbit suggestive of plexus form neurofibroma grossly similar prior.  Numerous small soft tissue nodules are seen diffusely over the scalp, face and neck consistent with widespread neurofibromas.  Remainder of the right orbit is otherwise intact.  Left orbit is within normal limits.  Probable prior surgery to the left ocular lens.  Both ocular lenses are anteriorly located.  Both globes appear symmetric and intact.  Scattered nonspecific dermal calcifications noted.    No acute displaced fracture-dislocation identified.  No subcutaneous emphysema or radiodense retained foreign body.    Multilevel age-related mild to moderate degenerative changes of the included cervical spine.  Multiple dental caries and several missing teeth noted.  Paranasal sinuses, middle ears and mastoid air cells are clear.    Numerous scattered small lytic appearing foci throughout the cervical spine.                                CT Maxillofacial Without Contrast (Final result)  Result time 02/04/19 13:12:18    Final result by Imtiaz Harrison MD (02/04/19 13:12:18)                 Impression:      1. No acute intracranial abnormality identified.  2. Age-related mild senescent changes as above.  3. Right thalamic suspected remote lacunar type infarct, unchanged.  4. Left periorbital soft tissue swelling/contusion, without maxillofacial acute displaced fracture-dislocation identified.  5. 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  6. Innumerable subcutaneous nodules consistent with neurofibromatosis type 1.  7. Numerous scattered small lytic appearing foci throughout the cervical spine.  If clinical concern for osseous lytic metastases, further evaluation with elective/nonemergent bone scan can be obtained as warranted in the management of this patient.      Electronically signed by: Imtiaz Harrison MD  Date:    02/04/2019  Time:    13:12             Narrative:    EXAMINATION:  CT HEAD WITHOUT CONTRAST; CT MAXILLOFACIAL WITHOUT CONTRAST    CLINICAL HISTORY:  Maxface trauma blunt;    TECHNIQUE:  Low dose axial CT images obtained throughout the head and maxillofacial region without intravenous contrast. Sagittal and coronal reconstructions were performed.    COMPARISON:  Head CT 12/05/2017    FINDINGS:  Head CT:    Intracranial compartment:    Age-appropriate generalized cerebral volume loss.  Ventricles are midline and stable in size configuration without distortion by mass effect or acute hydrocephalus.  No extra-axial blood or fluid collections.    Grossly stable distribution of supratentorial white matter chronic small vessel ischemic change.  Right thalamic suspected remote lacunar type infarct, unchanged.  No definite new focal areas of abnormal parenchymal attenuation.  Stable  small nonspecific parenchymal calcification within the left cerebellum.  No parenchymal mass, hemorrhage, edema or major vascular distribution infarct.  Calcific atherosclerosis of the bilateral cavernous ICAs.    Skull/extracranial contents (limited evaluation): No displaced skull fracture.    Maxillofacial CT: Interval left periorbital mild soft tissue swelling/contusion.    The greater wing of the sphenoid bone on the right and also posterior aspect of the right orbital roof appear to be absent similar to prior, which could be from dysplasia or prior resection.  There is abnormal increased soft tissue in the preseptal aspect of the right orbit as well as extending more posteriorly along the superior orbit suggestive of plexus form neurofibroma grossly similar prior.  Numerous small soft tissue nodules are seen diffusely over the scalp, face and neck consistent with widespread neurofibromas.  Remainder of the right orbit is otherwise intact.  Left orbit is within normal limits.  Probable prior surgery to the left ocular lens.  Both ocular lenses are anteriorly located.  Both globes appear symmetric and intact.  Scattered nonspecific dermal calcifications noted.    No acute displaced fracture-dislocation identified.  No subcutaneous emphysema or radiodense retained foreign body.    Multilevel age-related mild to moderate degenerative changes of the included cervical spine.  Multiple dental caries and several missing teeth noted.  Paranasal sinuses, middle ears and mastoid air cells are clear.    Numerous scattered small lytic appearing foci throughout the cervical spine.                                 Medical Decision Making:   Initial Assessment:   Urgent evaluation of a 73-year-old male with a past medical history of neurofibromatosis, presenting to the emergency department with complaints of laceration to the left upper eyelid.  Patient is afebrile, nontoxic appearing, hemodynamically stable. Physical exam  reveals a 2 cm laceration noted to the left superior orbit with no palpable deformity, crepitus, step-off.  No active bleeding.  Will plan for imaging, laceration care and possible repair.  Clinical Tests:   Radiological Study: Ordered and Reviewed  ED Management:  Wound was cleaned extensively in the emergency department.  It is over 12 hr old at this point, healing has started with poorly approximated edges.  I attempted to reapproximate them however eyes they were close I did not feel like re-laceration in the area.  Dermabond and Steri-Strips were applied per procedure note, the patient tolerated this well.  CT head shows no acute intracranial abnormality, age-related changes noted as well as some chronic baseline changes.  At this point, no further testing imaging is warranted.  Patient is counseled on symptomatic care and treatment as well as wound care.  He is stable for discharge home.The patient was instructed to follow up with a primary care provider in 2 days or to return to the emergency department for worsening symptoms. The treatment plan was discussed with the patient who demonstrated understanding and comfort with plan.    This note was created using [employer]. There may be typographical errors secondary to dictation.                         Clinical Impression:     1. Laceration of left eyebrow, initial encounter    2. Fall, initial encounter           Disposition:   Disposition: Discharged  Condition: Stable                        Rosita France PA-C  02/04/19 [id number]

## 2019-02-04 NOTE — ED NOTES
Appearance: Pt awake, alert & oriented to person, place & time. Pt in no acute distress at present time. Pt is clean and well groomed with clothes appropriately fastened.   Skin: Skin warm, dry . Color consistent with ethnicity. Mucous membranes moist. Pt with bruising to L upper eyelid with approx. 2.5 cm laceration, in which it appears closed shut. No bleeding from site. Pt denies pain or blurred vision at this time.   Musculoskeletal: Patient moving all extremities well, no obvious swelling or deformities noted.   Respiratory: Respirations spontaneous, even, and non-labored. Visible chest rise noted. Airway is open and patent. No accessory muscle use noted.   Neurologic: Sensation is intact. Speech is clear and appropriate. Eyes open spontaneously, behavior appropriate to situation, follows commands,  purposeful motor response noted.   Cardiac:  Pt denies active chest pains, SOB, dizziness, blurred vision, weakness or fatigue at this time.

## 2019-02-05 ENCOUNTER — TELEPHONE (OUTPATIENT)
Dept: ADMINISTRATIVE | Facility: HOSPITAL | Age: 74
End: 2019-02-05

## 2019-02-05 NOTE — TELEPHONE ENCOUNTER
Spoke to Mr. Moulton to schedule a follow up appt.  Patient confirmed date and time.  Instructed patient to call the office for any further questions or concerns.

## 2019-02-05 NOTE — TELEPHONE ENCOUNTER
Please contact patient schedule for annual visit has care gaps that needs to be addressed and ER f/u and referrals needed.

## 2019-02-05 NOTE — TELEPHONE ENCOUNTER
Patient was seen in ER for dx: laceration above the L eyebrow and s/p fall . Previous h/o fall . Please have office contact patient to schedule fu . Also , patient was seen last year by Neurology and unclear if he needs fu appt. I advised him of the need to schedule Urology fu as he was last seen DOS 1/4/2018 and RTC requested in 6 months r/t elevated PSA.     Please let me know if I can assist in any way.     Thanks ,  Leonardo Verma, RN   [facility] RN Navigator /   [facility]  Phone : [phone]

## 2019-02-06 ENCOUNTER — LAB VISIT (OUTPATIENT)
Dept: LAB | Facility: OTHER | Age: 74
End: 2019-02-06
Attending: INTERNAL MEDICINE
Payer: MEDICARE

## 2019-02-06 ENCOUNTER — OFFICE VISIT (OUTPATIENT)
Dept: INTERNAL MEDICINE | Facility: CLINIC | Age: 74
End: 2019-02-06
Attending: INTERNAL MEDICINE
Payer: MEDICARE

## 2019-02-06 VITALS
BODY MASS INDEX: 19.43 KG/M2 | SYSTOLIC BLOOD PRESSURE: 100 MMHG | HEIGHT: 73 IN | OXYGEN SATURATION: 99 % | DIASTOLIC BLOOD PRESSURE: 60 MMHG | WEIGHT: 146.63 LBS | HEART RATE: 83 BPM

## 2019-02-06 DIAGNOSIS — R79.9 ABNORMAL FINDING OF BLOOD CHEMISTRY: ICD-10-CM

## 2019-02-06 DIAGNOSIS — Z12.5 ENCOUNTER FOR SCREENING FOR MALIGNANT NEOPLASM OF PROSTATE: ICD-10-CM

## 2019-02-06 DIAGNOSIS — Z13.6 ENCOUNTER FOR LIPID SCREENING FOR CARDIOVASCULAR DISEASE: Primary | ICD-10-CM

## 2019-02-06 DIAGNOSIS — Z87.898 HISTORY OF ELEVATED PSA: ICD-10-CM

## 2019-02-06 DIAGNOSIS — M89.9 LYTIC BONE LESIONS ON XRAY: ICD-10-CM

## 2019-02-06 DIAGNOSIS — Z13.6 ENCOUNTER FOR LIPID SCREENING FOR CARDIOVASCULAR DISEASE: ICD-10-CM

## 2019-02-06 DIAGNOSIS — Z13.220 ENCOUNTER FOR LIPID SCREENING FOR CARDIOVASCULAR DISEASE: ICD-10-CM

## 2019-02-06 DIAGNOSIS — D49.89 NEOPLASM OF HEAD: ICD-10-CM

## 2019-02-06 DIAGNOSIS — Q85.01 NEUROFIBROMATOSIS, TYPE 1 (VON RECKLINGHAUSEN'S DISEASE): ICD-10-CM

## 2019-02-06 DIAGNOSIS — R23.4 CHANGES IN SKIN TEXTURE: ICD-10-CM

## 2019-02-06 DIAGNOSIS — R90.89 ABNORMAL CT OF BRAIN: ICD-10-CM

## 2019-02-06 DIAGNOSIS — M89.8X8 MASS OF SPINE: ICD-10-CM

## 2019-02-06 DIAGNOSIS — Z13.220 ENCOUNTER FOR LIPID SCREENING FOR CARDIOVASCULAR DISEASE: Primary | ICD-10-CM

## 2019-02-06 LAB
ALBUMIN SERPL BCP-MCNC: 3 G/DL
ALP SERPL-CCNC: 77 U/L
ALT SERPL W/O P-5'-P-CCNC: 10 U/L
ANION GAP SERPL CALC-SCNC: 10 MMOL/L
AST SERPL-CCNC: 11 U/L
BASOPHILS # BLD AUTO: 0.03 K/UL
BASOPHILS NFR BLD: 0.5 %
BILIRUB SERPL-MCNC: 0.7 MG/DL
BUN SERPL-MCNC: 15 MG/DL
CALCIUM SERPL-MCNC: 9 MG/DL
CHLORIDE SERPL-SCNC: 104 MMOL/L
CHOLEST SERPL-MCNC: 192 MG/DL
CHOLEST SERPL-MCNC: 192 MG/DL
CHOLEST/HDLC SERPL: 3 {RATIO}
CHOLEST/HDLC SERPL: 3 {RATIO}
CO2 SERPL-SCNC: 27 MMOL/L
COMPLEXED PSA SERPL-MCNC: 7 NG/ML
CREAT SERPL-MCNC: 0.9 MG/DL
DIFFERENTIAL METHOD: ABNORMAL
EOSINOPHIL # BLD AUTO: 0 K/UL
EOSINOPHIL NFR BLD: 0.7 %
ERYTHROCYTE [DISTWIDTH] IN BLOOD BY AUTOMATED COUNT: 13.5 %
EST. GFR  (AFRICAN AMERICAN): >60 ML/MIN/1.73 M^2
EST. GFR  (NON AFRICAN AMERICAN): >60 ML/MIN/1.73 M^2
ESTIMATED AVG GLUCOSE: 91 MG/DL
GLUCOSE SERPL-MCNC: 94 MG/DL
HBA1C MFR BLD HPLC: 4.8 %
HCT VFR BLD AUTO: 37.5 %
HDLC SERPL-MCNC: 64 MG/DL
HDLC SERPL-MCNC: 64 MG/DL
HDLC SERPL: 33.3 %
HDLC SERPL: 33.3 %
HGB BLD-MCNC: 11.5 G/DL
LDLC SERPL CALC-MCNC: 116.4 MG/DL
LDLC SERPL CALC-MCNC: 116.4 MG/DL
LYMPHOCYTES # BLD AUTO: 0.8 K/UL
LYMPHOCYTES NFR BLD: 15 %
MCH RBC QN AUTO: 31.1 PG
MCHC RBC AUTO-ENTMCNC: 30.7 G/DL
MCV RBC AUTO: 101 FL
MONOCYTES # BLD AUTO: 0.5 K/UL
MONOCYTES NFR BLD: 8.9 %
NEUTROPHILS # BLD AUTO: 4.1 K/UL
NEUTROPHILS NFR BLD: 74.7 %
NONHDLC SERPL-MCNC: 128 MG/DL
NONHDLC SERPL-MCNC: 128 MG/DL
PLATELET # BLD AUTO: 303 K/UL
PMV BLD AUTO: 9 FL
POTASSIUM SERPL-SCNC: 4 MMOL/L
PROT SERPL-MCNC: 7.7 G/DL
RBC # BLD AUTO: 3.7 M/UL
SODIUM SERPL-SCNC: 141 MMOL/L
TRIGL SERPL-MCNC: 58 MG/DL
TRIGL SERPL-MCNC: 58 MG/DL
TSH SERPL DL<=0.005 MIU/L-ACNC: 1.21 UIU/ML
WBC # BLD AUTO: 5.53 K/UL

## 2019-02-06 PROCEDURE — 99999 PR PBB SHADOW E&M-EST. PATIENT-LVL IV: ICD-10-PCS | Mod: PBBFAC,,, | Performed by: INTERNAL MEDICINE

## 2019-02-06 PROCEDURE — 83036 HEMOGLOBIN GLYCOSYLATED A1C: CPT

## 2019-02-06 PROCEDURE — 36415 COLL VENOUS BLD VENIPUNCTURE: CPT

## 2019-02-06 PROCEDURE — 99214 OFFICE O/P EST MOD 30 MIN: CPT | Mod: S$GLB,,, | Performed by: INTERNAL MEDICINE

## 2019-02-06 PROCEDURE — 1101F PR PT FALLS ASSESS DOC 0-1 FALLS W/OUT INJ PAST YR: ICD-10-PCS | Mod: CPTII,S$GLB,, | Performed by: INTERNAL MEDICINE

## 2019-02-06 PROCEDURE — 1101F PT FALLS ASSESS-DOCD LE1/YR: CPT | Mod: CPTII,S$GLB,, | Performed by: INTERNAL MEDICINE

## 2019-02-06 PROCEDURE — 85025 COMPLETE CBC W/AUTO DIFF WBC: CPT

## 2019-02-06 PROCEDURE — 99999 PR PBB SHADOW E&M-EST. PATIENT-LVL IV: CPT | Mod: PBBFAC,,, | Performed by: INTERNAL MEDICINE

## 2019-02-06 PROCEDURE — 99214 PR OFFICE/OUTPT VISIT, EST, LEVL IV, 30-39 MIN: ICD-10-PCS | Mod: S$GLB,,, | Performed by: INTERNAL MEDICINE

## 2019-02-06 PROCEDURE — 84153 ASSAY OF PSA TOTAL: CPT

## 2019-02-06 PROCEDURE — 80053 COMPREHEN METABOLIC PANEL: CPT

## 2019-02-06 PROCEDURE — 80061 LIPID PANEL: CPT

## 2019-02-06 PROCEDURE — 84443 ASSAY THYROID STIM HORMONE: CPT

## 2019-02-06 NOTE — PROGRESS NOTES
"Subjective:       Patient ID: Ishan Moulton is a 73 y.o. male.    Chief Complaint: Follow-up (hospita f/u due to fall )    Here for urgent visit    Seen 2 days prior in ED for laceration to forehead after a slip and fall over carpet. Had CT scan of head which revealed remote thalamic CVA  (unchanged but not read on prior) as well as numerous scattered small lytic appearing foci throughout the cervical spine. He has a Hx of elevated PSA that has been monitored. He has no other complaints than left shoulder pain that started up one day after his fall. He denies night sweats, neck pain, focal weakness, numbness/tingling, dizziness, tinnitus, dysphagia. He reports a little difficulty with balance when standing but once upright he is okay. He has loss 2 lbs over past year and 15lb down over past 2 years. Urinary frequency at baseline. No nocturia or gross hematuria.         Review of Systems   Constitutional: Negative for appetite change, chills, fever and unexpected weight change.   HENT: Negative for hearing loss, sore throat and trouble swallowing.    Eyes: Negative for visual disturbance.   Respiratory: Negative for cough, chest tightness and shortness of breath.    Cardiovascular: Negative for chest pain and leg swelling.   Gastrointestinal: Negative for abdominal pain, blood in stool, constipation, diarrhea, nausea and vomiting.   Endocrine: Negative for polydipsia and polyuria.   Genitourinary: Negative for decreased urine volume, difficulty urinating, dysuria, frequency and urgency.   Musculoskeletal: Negative for gait problem.   Skin: Negative for rash.   Neurological: Negative for dizziness and numbness.   Psychiatric/Behavioral: The patient is not nervous/anxious.        Objective:      Vitals:    02/06/19 1307   BP: 100/60   BP Location: Left arm   Patient Position: Sitting   BP Method: Small (Manual)   Pulse: 83   SpO2: 99%   Weight: 66.5 kg (146 lb 9.7 oz)   Height: 6' 1" (1.854 m)      Physical Exam "   Constitutional: He is oriented to person, place, and time. He appears well-developed and well-nourished. No distress.   HENT:   Head: Normocephalic and atraumatic.   Mouth/Throat: Oropharynx is clear and moist. No oropharyngeal exudate.   Eyes: Conjunctivae and EOM are normal. Pupils are equal, round, and reactive to light. No scleral icterus.   Neck: No thyromegaly present.   Cardiovascular: Normal rate, regular rhythm and normal heart sounds.   No murmur heard.  Pulmonary/Chest: Effort normal and breath sounds normal. He has no wheezes. He has no rales.   Abdominal: Soft. He exhibits no distension. There is no tenderness.   Musculoskeletal: He exhibits no edema or tenderness.   Lymphadenopathy:     He has no cervical adenopathy.   Neurological: He is alert and oriented to person, place, and time.   Skin: Skin is warm and dry.   Diffuse neurofibromatosis tumors     Psychiatric: He has a normal mood and affect. His behavior is normal.       Assessment:       1. Encounter for lipid screening for cardiovascular disease    2. Lytic bone lesions on xray    3. History of elevated PSA    4. Neurofibromatosis, type 1 (von Recklinghausen's disease)    5. Mass of spine    6. Encounter for screening for malignant neoplasm of prostate     7. Abnormal finding of blood chemistry     8. Changes in skin texture     9. Neoplasm of head    10. Abnormal CT of brain        Plan:       Ishan was seen today for follow-up.    Diagnoses and all orders for this visit:    Encounter for lipid screening for cardiovascular disease  -     Lipid panel; Future    Lytic bone lesions on xray  Will get updated PSA and bone scan to start. If primary unclear consider IR bone Bx for path.  -     Comprehensive metabolic panel; Future  -     Lipid panel; Future  -     TSH; Future  -     CBC auto differential; Future  -     Hemoglobin A1c; Future  -     NM Bone Scan Whole Body; Future    History of elevated PSA  -     PSA, Screening;  Future    Neurofibromatosis, type 1 (von Recklinghausen's disease)    Mass of spine  -     NM Bone Scan Whole Body; Future    Encounter for screening for malignant neoplasm of prostate   -     PSA, Screening; Future    Abnormal finding of blood chemistry   -     Lipid panel; Future  -     Hemoglobin A1c; Future    Changes in skin texture   -     TSH; Future    Abnormal CT of head  -     MRI Brain W WO Contrast; Future    RTC in 2 months or sooner prn                 Side effects of medication(s) were discussed in detail and patient voiced understanding.  Patient will call back for any issues or complications.

## 2019-02-07 ENCOUNTER — HOSPITAL ENCOUNTER (OUTPATIENT)
Dept: RADIOLOGY | Facility: OTHER | Age: 74
Discharge: HOME | End: 2019-02-07
Attending: INTERNAL MEDICINE
Payer: MEDICARE

## 2019-02-07 ENCOUNTER — TELEPHONE (OUTPATIENT)
Dept: INTERNAL MEDICINE | Facility: CLINIC | Age: 74
End: 2019-02-07

## 2019-02-07 DIAGNOSIS — R63.4 UNINTENTIONAL WEIGHT LOSS: ICD-10-CM

## 2019-02-07 DIAGNOSIS — D49.59 NEOPLASM OF PROSTATE: ICD-10-CM

## 2019-02-07 DIAGNOSIS — M89.9 LYTIC BONE LESIONS ON XRAY: ICD-10-CM

## 2019-02-07 DIAGNOSIS — M89.9 LYTIC BONE LESIONS ON XRAY: Primary | ICD-10-CM

## 2019-02-07 DIAGNOSIS — D64.9 ANEMIA, UNSPECIFIED TYPE: ICD-10-CM

## 2019-02-07 PROCEDURE — 74177 CT ABD & PELVIS W/CONTRAST: CPT | Mod: 26,,, | Performed by: RADIOLOGY

## 2019-02-07 PROCEDURE — 71260 CT THORAX DX C+: CPT | Mod: 26,,, | Performed by: RADIOLOGY

## 2019-02-07 PROCEDURE — 74177 CT CHEST ABDOMEN PELVIS WITH CONTRAST (XPD): ICD-10-PCS | Mod: 26,,, | Performed by: RADIOLOGY

## 2019-02-07 PROCEDURE — 25500020 PHARM REV CODE 255: Performed by: INTERNAL MEDICINE

## 2019-02-07 PROCEDURE — 71260 CT CHEST ABDOMEN PELVIS WITH CONTRAST (XPD): ICD-10-PCS | Mod: 26,,, | Performed by: RADIOLOGY

## 2019-02-07 PROCEDURE — 74177 CT ABD & PELVIS W/CONTRAST: CPT | Mod: TC

## 2019-02-07 RX ADMIN — IOHEXOL 75 ML: 350 INJECTION, SOLUTION INTRAVENOUS at 04:02

## 2019-02-07 RX ADMIN — IOHEXOL 30 ML: 350 INJECTION, SOLUTION INTRAVENOUS at 05:02

## 2019-02-07 NOTE — TELEPHONE ENCOUNTER
Please cancel nuclear bone scan for tomorrow. Please schedule CT scan with contrast ASAP. Pt aware and expecting phone call.

## 2019-02-08 ENCOUNTER — TELEPHONE (OUTPATIENT)
Dept: INTERNAL MEDICINE | Facility: CLINIC | Age: 74
End: 2019-02-08

## 2019-02-08 NOTE — TELEPHONE ENCOUNTER
Spoke to Mr. Andersons and informed him that once the DrStephanie Review his CT results we will get in touch with him,  Patient states understanding with no further questions.

## 2019-02-12 ENCOUNTER — TELEPHONE (OUTPATIENT)
Dept: INTERNAL MEDICINE | Facility: CLINIC | Age: 74
End: 2019-02-12

## 2019-02-12 DIAGNOSIS — D49.2 MUSCULOSKELETAL TUMOR: ICD-10-CM

## 2019-02-13 NOTE — TELEPHONE ENCOUNTER
Contacted Mr. Moulton and notified him about the  Bone scan appointment that we have scheduled for  2/21/19 at 8:00 am. Appointment letter mailed out today.  Cecilia

## 2019-02-21 ENCOUNTER — HOSPITAL ENCOUNTER (OUTPATIENT)
Dept: RADIOLOGY | Facility: OTHER | Age: 74
Discharge: HOME | End: 2019-02-21
Attending: INTERNAL MEDICINE
Payer: MEDICARE

## 2019-02-21 ENCOUNTER — TELEPHONE (OUTPATIENT)
Dept: INTERNAL MEDICINE | Facility: CLINIC | Age: 74
End: 2019-02-21

## 2019-02-21 DIAGNOSIS — M89.9 LYTIC BONE LESIONS ON XRAY: ICD-10-CM

## 2019-02-21 DIAGNOSIS — M89.8X8 MASS OF SPINE: ICD-10-CM

## 2019-02-21 PROCEDURE — 78306 NM BONE SCAN WHOLE BODY: ICD-10-PCS | Mod: 26,,, | Performed by: RADIOLOGY

## 2019-02-21 PROCEDURE — 78306 BONE IMAGING WHOLE BODY: CPT | Mod: 26,,, | Performed by: RADIOLOGY

## 2019-02-21 PROCEDURE — A9503 TC99M MEDRONATE: HCPCS

## 2019-02-21 NOTE — TELEPHONE ENCOUNTER
Please let pt know his bone scan is overall normal and does not show any signs of cancer or spread of cancer. No additional testing at this time and will discuss further at our f/u appt

## 2019-02-21 NOTE — TELEPHONE ENCOUNTER
Pt. Notified that his bone scan is overall normal, and does not show any signs of cancer or spread of cancer. No additional testing at this time and will discuss further at our f/u appt.   The patient verbalized understanding and had no further questions or concerns.  Cecilia

## 2019-02-22 ENCOUNTER — HOSPITAL ENCOUNTER (OUTPATIENT)
Dept: RADIOLOGY | Facility: OTHER | Age: 74
Discharge: HOME | End: 2019-02-22
Attending: INTERNAL MEDICINE
Payer: MEDICARE

## 2019-02-22 DIAGNOSIS — D49.89 NEOPLASM OF HEAD: ICD-10-CM

## 2019-02-22 PROCEDURE — 70553 MRI BRAIN W WO CONTRAST: ICD-10-PCS | Mod: 26,,, | Performed by: RADIOLOGY

## 2019-02-22 PROCEDURE — 70553 MRI BRAIN STEM W/O & W/DYE: CPT | Mod: 26,,, | Performed by: RADIOLOGY

## 2019-02-22 PROCEDURE — A9585 GADOBUTROL INJECTION: HCPCS | Performed by: INTERNAL MEDICINE

## 2019-02-22 PROCEDURE — 25500020 PHARM REV CODE 255: Performed by: INTERNAL MEDICINE

## 2019-02-22 PROCEDURE — 70553 MRI BRAIN STEM W/O & W/DYE: CPT | Mod: TC

## 2019-02-22 RX ORDER — GADOBUTROL 604.72 MG/ML
7 INJECTION INTRAVENOUS
Status: COMPLETED | OUTPATIENT
Start: 2019-02-22 | End: 2019-02-22

## 2019-02-22 RX ADMIN — GADOBUTROL 7 ML: 604.72 INJECTION INTRAVENOUS at 09:02

## 2019-05-25 NOTE — TELEPHONE ENCOUNTER
Please let patient know that overall his heart ultrasound looks good except for one small area. It may be an insignificant, non contributing finding, but to determine this I would like for him to discuss further with a cardiologist (referral already ordered) Please schedule.  
Pt informed of results and advice.  States verbal understanding.  Appt scheduled and mailed to pt. Patient has no further questions or concerns.    
supervision

## 2019-07-03 ENCOUNTER — TELEPHONE (OUTPATIENT)
Dept: INTERNAL MEDICINE | Facility: CLINIC | Age: 74
End: 2019-07-03

## 2019-07-03 NOTE — TELEPHONE ENCOUNTER
----- Message from Ade Hylton sent at 7/3/2019  3:25 PM CDT -----  Contact: Dr. Garcia- Brother  Name of Who is Calling: JENY GARCIA [[id number]]      What is the request in detail: pt would like the results of his test from February. Please contact to further discuss and advise.     Can the clinic reply by [facility]: N       [facility]: [phone]

## 2019-07-15 ENCOUNTER — TELEPHONE (OUTPATIENT)
Dept: INTERNAL MEDICINE | Facility: CLINIC | Age: 74
End: 2019-07-15

## 2019-07-16 ENCOUNTER — TELEPHONE (OUTPATIENT)
Dept: INTERNAL MEDICINE | Facility: CLINIC | Age: 74
End: 2019-07-16

## 2019-07-16 NOTE — TELEPHONE ENCOUNTER
----- Message from Terese Littlejohn sent at 7/16/2019  3:28 PM CDT -----  Contact: pt's brother  mike bryan [phone]  Type: Patient Call Back    Who called:pt's brother  mike bryan [phone]    What is the request in detail:pt's brother wants to discuss test results. Call back    Can the clinic reply by [facility]?    Would the patient rather a call back or a response via [facility]? Call back    Best call back number:pt's brother  mike bryan [phone]  Additional Information:

## 2019-07-18 ENCOUNTER — TELEPHONE (OUTPATIENT)
Dept: INTERNAL MEDICINE | Facility: CLINIC | Age: 74
End: 2019-07-18

## 2019-07-23 ENCOUNTER — LAB VISIT (OUTPATIENT)
Dept: LAB | Facility: OTHER | Age: 74
End: 2019-07-23
Attending: INTERNAL MEDICINE
Payer: MEDICARE

## 2019-07-23 ENCOUNTER — OFFICE VISIT (OUTPATIENT)
Dept: INTERNAL MEDICINE | Facility: CLINIC | Age: 74
End: 2019-07-23
Attending: INTERNAL MEDICINE
Payer: MEDICARE

## 2019-07-23 VITALS
SYSTOLIC BLOOD PRESSURE: 132 MMHG | BODY MASS INDEX: 20.74 KG/M2 | WEIGHT: 156.5 LBS | HEART RATE: 73 BPM | HEIGHT: 73 IN | OXYGEN SATURATION: 97 % | DIASTOLIC BLOOD PRESSURE: 70 MMHG

## 2019-07-23 DIAGNOSIS — R97.20 ELEVATED PSA: ICD-10-CM

## 2019-07-23 DIAGNOSIS — Z86.73 HISTORY OF CARDIOEMBOLIC CEREBROVASCULAR ACCIDENT (CVA): ICD-10-CM

## 2019-07-23 DIAGNOSIS — E53.8 B12 DEFICIENCY: ICD-10-CM

## 2019-07-23 DIAGNOSIS — R53.82 CHRONIC FATIGUE: ICD-10-CM

## 2019-07-23 DIAGNOSIS — D64.9 ANEMIA, UNSPECIFIED TYPE: ICD-10-CM

## 2019-07-23 DIAGNOSIS — R97.20 ELEVATED PSA: Primary | ICD-10-CM

## 2019-07-23 DIAGNOSIS — Q85.00 NEUROFIBROMATOSIS (NONMALIGNANT): ICD-10-CM

## 2019-07-23 LAB
ALBUMIN SERPL BCP-MCNC: 3.8 G/DL (ref 3.5–5.2)
ALP SERPL-CCNC: 83 U/L (ref 55–135)
ALT SERPL W/O P-5'-P-CCNC: 11 U/L (ref 10–44)
ANION GAP SERPL CALC-SCNC: 9 MMOL/L (ref 8–16)
AST SERPL-CCNC: 12 U/L (ref 10–40)
BASOPHILS # BLD AUTO: 0.03 K/UL (ref 0–0.2)
BASOPHILS NFR BLD: 0.5 % (ref 0–1.9)
BILIRUB SERPL-MCNC: 0.5 MG/DL (ref 0.1–1)
BUN SERPL-MCNC: 18 MG/DL (ref 8–23)
CALCIUM SERPL-MCNC: 9.4 MG/DL (ref 8.7–10.5)
CHLORIDE SERPL-SCNC: 104 MMOL/L (ref 95–110)
CO2 SERPL-SCNC: 28 MMOL/L (ref 23–29)
CREAT SERPL-MCNC: 1 MG/DL (ref 0.5–1.4)
DIFFERENTIAL METHOD: ABNORMAL
EOSINOPHIL # BLD AUTO: 0.1 K/UL (ref 0–0.5)
EOSINOPHIL NFR BLD: 1.1 % (ref 0–8)
ERYTHROCYTE [DISTWIDTH] IN BLOOD BY AUTOMATED COUNT: 13 % (ref 11.5–14.5)
EST. GFR  (AFRICAN AMERICAN): >60 ML/MIN/1.73 M^2
EST. GFR  (NON AFRICAN AMERICAN): >60 ML/MIN/1.73 M^2
GLUCOSE SERPL-MCNC: 83 MG/DL (ref 70–110)
HCT VFR BLD AUTO: 41.8 % (ref 40–54)
HGB BLD-MCNC: 12.8 G/DL (ref 14–18)
IMM GRANULOCYTES # BLD AUTO: 0.01 K/UL (ref 0–0.04)
IMM GRANULOCYTES NFR BLD AUTO: 0.2 % (ref 0–0.5)
LYMPHOCYTES # BLD AUTO: 1 K/UL (ref 1–4.8)
LYMPHOCYTES NFR BLD: 18.7 % (ref 18–48)
MCH RBC QN AUTO: 31.7 PG (ref 27–31)
MCHC RBC AUTO-ENTMCNC: 30.6 G/DL (ref 32–36)
MCV RBC AUTO: 104 FL (ref 82–98)
MONOCYTES # BLD AUTO: 0.7 K/UL (ref 0.3–1)
MONOCYTES NFR BLD: 12.2 % (ref 4–15)
NEUTROPHILS # BLD AUTO: 3.7 K/UL (ref 1.8–7.7)
NEUTROPHILS NFR BLD: 67.3 % (ref 38–73)
NRBC BLD-RTO: 0 /100 WBC
PLATELET # BLD AUTO: 245 K/UL (ref 150–350)
PMV BLD AUTO: 9 FL (ref 9.2–12.9)
POTASSIUM SERPL-SCNC: 4.6 MMOL/L (ref 3.5–5.1)
PROSTATE SPECIFIC ANTIGEN, TOTAL: 6.1 NG/ML (ref 0–4)
PROT SERPL-MCNC: 7.9 G/DL (ref 6–8.4)
PSA FREE MFR SERPL: 28.36 %
PSA FREE SERPL-MCNC: 1.73 NG/ML (ref 0.01–1.5)
RBC # BLD AUTO: 4.04 M/UL (ref 4.6–6.2)
SODIUM SERPL-SCNC: 141 MMOL/L (ref 136–145)
TSH SERPL DL<=0.005 MIU/L-ACNC: 1.09 UIU/ML (ref 0.4–4)
VIT B12 SERPL-MCNC: 777 PG/ML (ref 210–950)
WBC # BLD AUTO: 5.5 K/UL (ref 3.9–12.7)

## 2019-07-23 PROCEDURE — 1101F PR PT FALLS ASSESS DOC 0-1 FALLS W/OUT INJ PAST YR: ICD-10-PCS | Mod: CPTII,S$GLB,, | Performed by: INTERNAL MEDICINE

## 2019-07-23 PROCEDURE — 85025 COMPLETE CBC W/AUTO DIFF WBC: CPT

## 2019-07-23 PROCEDURE — 36415 COLL VENOUS BLD VENIPUNCTURE: CPT

## 2019-07-23 PROCEDURE — 99999 PR PBB SHADOW E&M-EST. PATIENT-LVL IV: CPT | Mod: PBBFAC,,, | Performed by: INTERNAL MEDICINE

## 2019-07-23 PROCEDURE — 99999 PR PBB SHADOW E&M-EST. PATIENT-LVL IV: ICD-10-PCS | Mod: PBBFAC,,, | Performed by: INTERNAL MEDICINE

## 2019-07-23 PROCEDURE — 99214 PR OFFICE/OUTPT VISIT, EST, LEVL IV, 30-39 MIN: ICD-10-PCS | Mod: S$GLB,,, | Performed by: INTERNAL MEDICINE

## 2019-07-23 PROCEDURE — 80053 COMPREHEN METABOLIC PANEL: CPT

## 2019-07-23 PROCEDURE — 1101F PT FALLS ASSESS-DOCD LE1/YR: CPT | Mod: CPTII,S$GLB,, | Performed by: INTERNAL MEDICINE

## 2019-07-23 PROCEDURE — 84443 ASSAY THYROID STIM HORMONE: CPT

## 2019-07-23 PROCEDURE — 84154 ASSAY OF PSA FREE: CPT

## 2019-07-23 PROCEDURE — 99499 UNLISTED E&M SERVICE: CPT | Mod: S$GLB,,, | Performed by: INTERNAL MEDICINE

## 2019-07-23 PROCEDURE — 99499 RISK ADDL DX/OHS AUDIT: ICD-10-PCS | Mod: S$GLB,,, | Performed by: INTERNAL MEDICINE

## 2019-07-23 PROCEDURE — 82607 VITAMIN B-12: CPT

## 2019-07-23 PROCEDURE — 99214 OFFICE O/P EST MOD 30 MIN: CPT | Mod: S$GLB,,, | Performed by: INTERNAL MEDICINE

## 2019-07-23 RX ORDER — ATORVASTATIN CALCIUM 40 MG/1
40 TABLET, FILM COATED ORAL DAILY
Qty: 90 TABLET | Refills: 3 | Status: SHIPPED | OUTPATIENT
Start: 2019-07-23 | End: 2020-07-22

## 2019-07-23 RX ORDER — BRIMONIDINE TARTRATE 2 MG/ML
SOLUTION/ DROPS OPHTHALMIC
Refills: 12 | COMMUNITY
Start: 2019-07-02

## 2019-07-23 RX ORDER — DOXYCYCLINE 100 MG/1
100 CAPSULE ORAL 2 TIMES DAILY
Refills: 0 | COMMUNITY
Start: 2019-05-24 | End: 2019-11-24

## 2019-07-23 NOTE — PROGRESS NOTES
"Subjective:       Patient ID: Ishan Moulton is a 73 y.o. male.    Chief Complaint: Mole (discharge and bleeding) and Follow-up    Here for     Had to sell his home and moved to [place] and while there he had lesions require I&D, one from right arm and one from back. No acute issues.     Chronically elevated PSA. Due for repeat PSA. Will get today.       Review of Systems   Constitutional: Negative for appetite change, chills, fever and unexpected weight change.   HENT: Negative for hearing loss, sore throat and trouble swallowing.    Eyes: Negative for visual disturbance.   Respiratory: Negative for cough, chest tightness and shortness of breath.    Cardiovascular: Negative for chest pain and leg swelling.   Gastrointestinal: Negative for abdominal pain, blood in stool, constipation, diarrhea, nausea and vomiting.   Endocrine: Negative for polydipsia and polyuria.   Genitourinary: Negative for decreased urine volume, difficulty urinating, dysuria, frequency and urgency.   Musculoskeletal: Negative for gait problem.   Skin: Negative for rash.   Neurological: Negative for dizziness and numbness.   Psychiatric/Behavioral: The patient is not nervous/anxious.        Objective:      Vitals:    07/23/19 1356   BP: 132/70   Pulse: 73   SpO2: 97%   Weight: 71 kg (156 lb 8.4 oz)   Height: 6' 1" (1.854 m)      Physical Exam   Constitutional: He is oriented to person, place, and time. He appears well-developed and well-nourished. No distress.   HENT:   Head: Normocephalic and atraumatic.   Mouth/Throat: Oropharynx is clear and moist. No oropharyngeal exudate.   Eyes: Pupils are equal, round, and reactive to light. Conjunctivae and EOM are normal. No scleral icterus.   Neck: No thyromegaly present.   Cardiovascular: Normal rate, regular rhythm and normal heart sounds.   No murmur heard.  Pulmonary/Chest: Effort normal and breath sounds normal. He has no wheezes. He has no rales.   Abdominal: Soft. He exhibits no distension. " There is no tenderness.   Musculoskeletal: He exhibits no edema or tenderness.   Lymphadenopathy:     He has no cervical adenopathy.   Neurological: He is alert and oriented to person, place, and time.   Skin: Skin is warm and dry.   Psychiatric: He has a normal mood and affect. His behavior is normal.       Assessment:       1. Elevated PSA    2. Chronic fatigue    3. Anemia, unspecified type    4. B12 deficiency    5. Neurofibromatosis (nonmalignant)    6. History of cardioembolic cerebrovascular accident (CVA)        Plan:       Ishan was seen today for mole and follow-up.    Diagnoses and all orders for this visit:    Elevated PSA  -     PSA, total and free; Future    Chronic fatigue  -     Comprehensive metabolic panel; Future  -     TSH; Future  -     CBC auto differential; Future    Anemia, unspecified type  -     CBC auto differential; Future  -     Vitamin B12; Future    Neurofibromatosis (nonmalignant)  I believe he is trapping moisture in between SQ nodules leading to maceration of skin and subsequent infection. I have recommended he dry off in front of fan to assist this.  -     Ambulatory Referral to Dermatology  -     atorvastatin (LIPITOR) 40 MG tablet; Take 1 tablet (40 mg total) by mouth once daily.    History of cardioembolic cerebrovascular accident (CVA)  Comments:  Incidental. Seen 02/2019 and deemed stable compared to 2017    RTC in 4 months or sooner wilbur Harrison MD  Internal Medicine-[facility]        Side effects of medication(s) were discussed in detail and patient voiced understanding.  Patient will call back for any issues or complications.

## 2019-07-30 ENCOUNTER — TELEPHONE (OUTPATIENT)
Dept: INTERNAL MEDICINE | Facility: CLINIC | Age: 74
End: 2019-07-30

## 2019-07-30 NOTE — TELEPHONE ENCOUNTER
Called pt and LVM stating that the staff was returning call   Left office number for pt to call

## 2019-07-30 NOTE — TELEPHONE ENCOUNTER
----- Message from Terrence Sol sent at 7/30/2019  2:07 PM CDT -----  Contact: JENY GARCIA [[id number]]  Name of Who is Calling:JENY GARCIA [[id number]]    What is the request in detail: Pt requesting to speak with staff regarding status of RTA paperwork submitted in office at previous visit. Contact at your earliest convenience.      Can the clinic reply by [facility]: N    What Number to Call Back if not in [facility]: [phone] or

## 2019-09-11 ENCOUNTER — TELEPHONE (OUTPATIENT)
Dept: INTERNAL MEDICINE | Facility: CLINIC | Age: 74
End: 2019-09-11

## 2019-09-11 NOTE — TELEPHONE ENCOUNTER
----- Message from Val Fontana sent at 9/11/2019  3:31 PM CDT -----  Contact: JENY GARCIA [[id number]]   Name of Who is Calling: JENY GARCIA [[id number]]    What is the request in detail: patient request call back in reference to medication that was to be giving to him  Please contact to further discuss and advise      Can the clinic reply by [facility]: no     What Number to Call Back if not in [facility]:  [phone]

## 2019-09-12 NOTE — TELEPHONE ENCOUNTER
Attempted to call Mr. Moulton to inform him to keep shin dry and schedule patient with a dermatology, but no answer.  LVM to call the office.

## 2019-09-30 ENCOUNTER — PATIENT OUTREACH (OUTPATIENT)
Dept: ADMINISTRATIVE | Facility: HOSPITAL | Age: 74
End: 2019-09-30

## 2019-10-14 ENCOUNTER — OFFICE VISIT (OUTPATIENT)
Dept: INTERNAL MEDICINE | Facility: CLINIC | Age: 74
End: 2019-10-14
Attending: INTERNAL MEDICINE
Payer: MEDICARE

## 2019-10-14 ENCOUNTER — LAB VISIT (OUTPATIENT)
Dept: LAB | Facility: OTHER | Age: 74
End: 2019-10-14
Attending: INTERNAL MEDICINE
Payer: MEDICARE

## 2019-10-14 VITALS
BODY MASS INDEX: 21.83 KG/M2 | HEIGHT: 73 IN | WEIGHT: 164.69 LBS | HEART RATE: 96 BPM | DIASTOLIC BLOOD PRESSURE: 58 MMHG | SYSTOLIC BLOOD PRESSURE: 127 MMHG | OXYGEN SATURATION: 96 %

## 2019-10-14 DIAGNOSIS — R41.3 MEMORY LOSS: ICD-10-CM

## 2019-10-14 DIAGNOSIS — R79.9 ABNORMAL FINDING OF BLOOD CHEMISTRY, UNSPECIFIED: ICD-10-CM

## 2019-10-14 DIAGNOSIS — R53.83 FATIGUE, UNSPECIFIED TYPE: ICD-10-CM

## 2019-10-14 DIAGNOSIS — I63.30 CEREBROVASCULAR ACCIDENT (CVA) DUE TO THROMBOSIS OF CEREBRAL ARTERY: ICD-10-CM

## 2019-10-14 DIAGNOSIS — D53.9 MACROCYTIC ANEMIA: Primary | ICD-10-CM

## 2019-10-14 DIAGNOSIS — D53.9 MACROCYTIC ANEMIA: ICD-10-CM

## 2019-10-14 DIAGNOSIS — Q85.01 NEUROFIBROMATOSIS, TYPE 1 (VON RECKLINGHAUSEN'S DISEASE): ICD-10-CM

## 2019-10-14 DIAGNOSIS — D64.9 ANEMIA, UNSPECIFIED TYPE: ICD-10-CM

## 2019-10-14 PROBLEM — R55 TRANSIENT LOSS OF CONSCIOUSNESS: Status: RESOLVED | Noted: 2018-01-15 | Resolved: 2019-10-14

## 2019-10-14 PROBLEM — R55 SYNCOPE: Status: RESOLVED | Noted: 2018-01-09 | Resolved: 2019-10-14

## 2019-10-14 LAB
ALBUMIN SERPL BCP-MCNC: 3.9 G/DL (ref 3.5–5.2)
ALP SERPL-CCNC: 98 U/L (ref 55–135)
ALT SERPL W/O P-5'-P-CCNC: 34 U/L (ref 10–44)
ANION GAP SERPL CALC-SCNC: 11 MMOL/L (ref 8–16)
AST SERPL-CCNC: 28 U/L (ref 10–40)
BASOPHILS # BLD AUTO: 0.05 K/UL (ref 0–0.2)
BASOPHILS NFR BLD: 0.9 % (ref 0–1.9)
BILIRUB SERPL-MCNC: 0.5 MG/DL (ref 0.1–1)
BUN SERPL-MCNC: 19 MG/DL (ref 8–23)
CALCIUM SERPL-MCNC: 10 MG/DL (ref 8.7–10.5)
CHLORIDE SERPL-SCNC: 104 MMOL/L (ref 95–110)
CO2 SERPL-SCNC: 27 MMOL/L (ref 23–29)
CREAT SERPL-MCNC: 1 MG/DL (ref 0.5–1.4)
DIFFERENTIAL METHOD: ABNORMAL
EOSINOPHIL # BLD AUTO: 0.1 K/UL (ref 0–0.5)
EOSINOPHIL NFR BLD: 1.4 % (ref 0–8)
ERYTHROCYTE [DISTWIDTH] IN BLOOD BY AUTOMATED COUNT: 13.1 % (ref 11.5–14.5)
EST. GFR  (AFRICAN AMERICAN): >60 ML/MIN/1.73 M^2
EST. GFR  (NON AFRICAN AMERICAN): >60 ML/MIN/1.73 M^2
ESTIMATED AVG GLUCOSE: 94 MG/DL (ref 68–131)
FERRITIN SERPL-MCNC: 386 NG/ML (ref 20–300)
FOLATE SERPL-MCNC: 14.9 NG/ML (ref 4–24)
GLUCOSE SERPL-MCNC: 87 MG/DL (ref 70–110)
HBA1C MFR BLD HPLC: 4.9 % (ref 4–5.6)
HCT VFR BLD AUTO: 42.8 % (ref 40–54)
HGB BLD-MCNC: 13.4 G/DL (ref 14–18)
IMM GRANULOCYTES # BLD AUTO: 0.02 K/UL (ref 0–0.04)
IMM GRANULOCYTES NFR BLD AUTO: 0.4 % (ref 0–0.5)
IRON SERPL-MCNC: 69 UG/DL (ref 45–160)
LYMPHOCYTES # BLD AUTO: 1.2 K/UL (ref 1–4.8)
LYMPHOCYTES NFR BLD: 21.5 % (ref 18–48)
MCH RBC QN AUTO: 31.5 PG (ref 27–31)
MCHC RBC AUTO-ENTMCNC: 31.3 G/DL (ref 32–36)
MCV RBC AUTO: 101 FL (ref 82–98)
MONOCYTES # BLD AUTO: 0.6 K/UL (ref 0.3–1)
MONOCYTES NFR BLD: 10.5 % (ref 4–15)
NEUTROPHILS # BLD AUTO: 3.7 K/UL (ref 1.8–7.7)
NEUTROPHILS NFR BLD: 65.3 % (ref 38–73)
NRBC BLD-RTO: 0 /100 WBC
PLATELET # BLD AUTO: 358 K/UL (ref 150–350)
PMV BLD AUTO: 9.2 FL (ref 9.2–12.9)
POTASSIUM SERPL-SCNC: 5.4 MMOL/L (ref 3.5–5.1)
PROT SERPL-MCNC: 8.9 G/DL (ref 6–8.4)
RBC # BLD AUTO: 4.26 M/UL (ref 4.6–6.2)
SATURATED IRON: 28 % (ref 20–50)
SODIUM SERPL-SCNC: 142 MMOL/L (ref 136–145)
TOTAL IRON BINDING CAPACITY: 247 UG/DL (ref 250–450)
TRANSFERRIN SERPL-MCNC: 167 MG/DL (ref 200–375)
TSH SERPL DL<=0.005 MIU/L-ACNC: 1.15 UIU/ML (ref 0.4–4)
VIT B12 SERPL-MCNC: 681 PG/ML (ref 210–950)
WBC # BLD AUTO: 5.71 K/UL (ref 3.9–12.7)

## 2019-10-14 PROCEDURE — 99214 OFFICE O/P EST MOD 30 MIN: CPT | Mod: S$GLB,,, | Performed by: INTERNAL MEDICINE

## 2019-10-14 PROCEDURE — 1101F PR PT FALLS ASSESS DOC 0-1 FALLS W/OUT INJ PAST YR: ICD-10-PCS | Mod: CPTII,S$GLB,, | Performed by: INTERNAL MEDICINE

## 2019-10-14 PROCEDURE — 82746 ASSAY OF FOLIC ACID SERUM: CPT

## 2019-10-14 PROCEDURE — 80053 COMPREHEN METABOLIC PANEL: CPT

## 2019-10-14 PROCEDURE — 85025 COMPLETE CBC W/AUTO DIFF WBC: CPT

## 2019-10-14 PROCEDURE — 82607 VITAMIN B-12: CPT

## 2019-10-14 PROCEDURE — 1101F PT FALLS ASSESS-DOCD LE1/YR: CPT | Mod: CPTII,S$GLB,, | Performed by: INTERNAL MEDICINE

## 2019-10-14 PROCEDURE — 36415 COLL VENOUS BLD VENIPUNCTURE: CPT

## 2019-10-14 PROCEDURE — 83036 HEMOGLOBIN GLYCOSYLATED A1C: CPT

## 2019-10-14 PROCEDURE — 99999 PR PBB SHADOW E&M-EST. PATIENT-LVL III: ICD-10-PCS | Mod: PBBFAC,,, | Performed by: INTERNAL MEDICINE

## 2019-10-14 PROCEDURE — 84425 ASSAY OF VITAMIN B-1: CPT

## 2019-10-14 PROCEDURE — 83540 ASSAY OF IRON: CPT

## 2019-10-14 PROCEDURE — 84443 ASSAY THYROID STIM HORMONE: CPT

## 2019-10-14 PROCEDURE — 99214 PR OFFICE/OUTPT VISIT, EST, LEVL IV, 30-39 MIN: ICD-10-PCS | Mod: S$GLB,,, | Performed by: INTERNAL MEDICINE

## 2019-10-14 PROCEDURE — 99999 PR PBB SHADOW E&M-EST. PATIENT-LVL III: CPT | Mod: PBBFAC,,, | Performed by: INTERNAL MEDICINE

## 2019-10-14 PROCEDURE — 82728 ASSAY OF FERRITIN: CPT

## 2019-10-14 NOTE — PROGRESS NOTES
"Subjective:       Patient ID: Ishan Moulton is a 74 y.o. male.    Chief Complaint: Follow-up; Elevated PSA; Dizziness; and Memory Loss    Here for routine f/u    73 yo M with PMhx of neurofibromatosis, elevated PSA, chronic macrocytic anemia, lacunar CVA presents with daughter    Moved in [facility], no assisted living. Daughter is concered about his meor . Getting confused about time, he will call people at certain times and sometimes odd hours of the day. He forgets how to be able to change his batteries for his hearing aids. His long term memory remains intact. He recalls prior verbal recs  I gave him months prior. He denies depressed mood. He is nervous about his reduction in memory.           Review of Systems   Constitutional: Negative for appetite change, chills, fever and unexpected weight change.   HENT: Negative for hearing loss, sore throat and trouble swallowing.    Eyes: Negative for visual disturbance.   Respiratory: Negative for cough, chest tightness and shortness of breath.    Cardiovascular: Negative for chest pain and leg swelling.   Gastrointestinal: Negative for abdominal pain, blood in stool, constipation, diarrhea, nausea and vomiting.   Endocrine: Negative for polydipsia and polyuria.   Genitourinary: Negative for decreased urine volume, difficulty urinating, dysuria, frequency and urgency.   Musculoskeletal: Negative for gait problem.   Skin: Negative for rash.   Neurological: Negative for dizziness and numbness.   Psychiatric/Behavioral: The patient is not nervous/anxious.        Objective:      Vitals:    10/14/19 1009   BP: (!) 127/58   Pulse: 96   SpO2: 96%   Weight: 74.7 kg (164 lb 10.9 oz)   Height: 6' 1" (1.854 m)      Physical Exam   Constitutional: He is oriented to person, place, and time. He appears well-developed and well-nourished. No distress.   HENT:   Head: Normocephalic and atraumatic.   Mouth/Throat: Oropharynx is clear and moist. No oropharyngeal exudate.   Eyes: " Pupils are equal, round, and reactive to light. Conjunctivae and EOM are normal. No scleral icterus.   Neck: No thyromegaly present.   Cardiovascular: Normal rate, regular rhythm and normal heart sounds.   No murmur heard.  Pulmonary/Chest: Effort normal and breath sounds normal. He has no wheezes. He has no rales.   Abdominal: Soft. He exhibits no distension. There is no tenderness.   Musculoskeletal: He exhibits no edema or tenderness.   Lymphadenopathy:     He has no cervical adenopathy.   Neurological: He is alert and oriented to person, place, and time.   Skin: Skin is warm and dry.   Psychiatric: He has a normal mood and affect. His behavior is normal.       Assessment:       1. Macrocytic anemia    2. Neurofibromatosis, type 1 (von Recklinghausen's disease)    3. Cerebrovascular accident (CVA) due to thrombosis of cerebral artery    4. Memory loss    5. Fatigue, unspecified type    6. Abnormal finding of blood chemistry, unspecified         Plan:       Ishan was seen today for follow-up, elevated psa, dizziness and memory loss.    Diagnoses and all orders for this visit:    Macrocytic anemia  -     Vitamin B12; Future  -     Folate; Future    Neurofibromatosis, type 1 (von Recklinghausen's disease)    Cerebrovascular accident (CVA) due to thrombosis of cerebral artery    Memory loss  -     Ambulatory Referral to Neuropsychology  -     TSH; Future  -     Comprehensive metabolic panel; Future  -     Vitamin B12; Future  -     Hemoglobin A1c; Future  -     Folate; Future  -     Vitamin B1; Future    Fatigue, unspecified type  -     CBC auto differential; Future  -     TSH; Future  -     Comprehensive metabolic panel; Future  -     Vitamin B12; Future  -     Hemoglobin A1c; Future  -     Folate; Future    Abnormal finding of blood chemistry, unspecified   -     Hemoglobin A1c; Future       RTC in 6 months or sooner prshiv Harrison MD  Internal Medicine-[facility]        Side effects of  medication(s) were discussed in detail and patient voiced understanding.  Patient will call back for any issues or complications.

## 2019-10-15 ENCOUNTER — IMMUNIZATION (OUTPATIENT)
Dept: PHARMACY | Facility: CLINIC | Age: 74
End: 2019-10-15

## 2019-10-15 ENCOUNTER — IMMUNIZATION (OUTPATIENT)
Dept: PHARMACY | Facility: CLINIC | Age: 74
End: 2019-10-15
Payer: MEDICARE

## 2019-10-17 LAB — VIT B1 BLD-MCNC: 81 UG/L (ref 38–122)

## 2019-10-21 ENCOUNTER — TELEPHONE (OUTPATIENT)
Dept: NEUROLOGY | Facility: CLINIC | Age: 74
End: 2019-10-21

## 2019-10-21 ENCOUNTER — TELEPHONE (OUTPATIENT)
Dept: INTERNAL MEDICINE | Facility: CLINIC | Age: 74
End: 2019-10-21

## 2019-10-21 NOTE — TELEPHONE ENCOUNTER
Please notify patient of results:    Your kidney function is normal.  Your liver studies are normal.  You do not have diabetes.  Your thyroid studies are normal.  Your cholesterol levels look good.  Your B12 and folic levels are normal.    Any questions or concerns can be relayed to me by staff or [facility] messaging.

## 2019-10-25 ENCOUNTER — TELEPHONE (OUTPATIENT)
Dept: INTERNAL MEDICINE | Facility: CLINIC | Age: 74
End: 2019-10-25

## 2019-10-25 NOTE — TELEPHONE ENCOUNTER
----- Message from Alvin Cedeno sent at 10/24/2019  3:52 PM CDT -----  Type:  Patient Returning Call    Who Called:  Sven Talley   Who Left Message for Patient:  N A  Does the patient know what this is regarding?:    Best Call Back Number:  [phone]   Additional Information:

## 2019-11-01 ENCOUNTER — TELEPHONE (OUTPATIENT)
Dept: INTERNAL MEDICINE | Facility: CLINIC | Age: 74
End: 2019-11-01

## 2019-11-01 DIAGNOSIS — R53.82 CHRONIC FATIGUE: Primary | ICD-10-CM

## 2019-11-01 DIAGNOSIS — R41.0 CONFUSION: ICD-10-CM

## 2019-11-05 ENCOUNTER — TELEPHONE (OUTPATIENT)
Dept: INTERNAL MEDICINE | Facility: CLINIC | Age: 74
End: 2019-11-05

## 2019-11-06 ENCOUNTER — OFFICE VISIT (OUTPATIENT)
Dept: NEUROLOGY | Facility: CLINIC | Age: 74
End: 2019-11-06
Payer: MEDICARE

## 2019-11-06 VITALS
HEIGHT: 73 IN | HEART RATE: 78 BPM | BODY MASS INDEX: 21.74 KG/M2 | DIASTOLIC BLOOD PRESSURE: 69 MMHG | WEIGHT: 164 LBS | SYSTOLIC BLOOD PRESSURE: 123 MMHG

## 2019-11-06 DIAGNOSIS — G30.1 LATE ONSET ALZHEIMER'S DISEASE WITHOUT BEHAVIORAL DISTURBANCE: Primary | ICD-10-CM

## 2019-11-06 DIAGNOSIS — F02.80 LATE ONSET ALZHEIMER'S DISEASE WITHOUT BEHAVIORAL DISTURBANCE: Primary | ICD-10-CM

## 2019-11-06 PROCEDURE — 1101F PT FALLS ASSESS-DOCD LE1/YR: CPT | Mod: CPTII,S$GLB,, | Performed by: PSYCHIATRY & NEUROLOGY

## 2019-11-06 PROCEDURE — 99999 PR PBB SHADOW E&M-EST. PATIENT-LVL III: CPT | Mod: PBBFAC,,, | Performed by: PSYCHIATRY & NEUROLOGY

## 2019-11-06 PROCEDURE — 99214 OFFICE O/P EST MOD 30 MIN: CPT | Mod: S$GLB,,, | Performed by: PSYCHIATRY & NEUROLOGY

## 2019-11-06 PROCEDURE — 1101F PR PT FALLS ASSESS DOC 0-1 FALLS W/OUT INJ PAST YR: ICD-10-PCS | Mod: CPTII,S$GLB,, | Performed by: PSYCHIATRY & NEUROLOGY

## 2019-11-06 PROCEDURE — 99499 RISK ADDL DX/OHS AUDIT: ICD-10-PCS | Mod: S$GLB,,, | Performed by: PSYCHIATRY & NEUROLOGY

## 2019-11-06 PROCEDURE — 99499 UNLISTED E&M SERVICE: CPT | Mod: S$GLB,,, | Performed by: PSYCHIATRY & NEUROLOGY

## 2019-11-06 PROCEDURE — 99214 PR OFFICE/OUTPT VISIT, EST, LEVL IV, 30-39 MIN: ICD-10-PCS | Mod: S$GLB,,, | Performed by: PSYCHIATRY & NEUROLOGY

## 2019-11-06 PROCEDURE — 99999 PR PBB SHADOW E&M-EST. PATIENT-LVL III: ICD-10-PCS | Mod: PBBFAC,,, | Performed by: PSYCHIATRY & NEUROLOGY

## 2019-11-06 RX ORDER — DONEPEZIL HYDROCHLORIDE 10 MG/1
10 TABLET, FILM COATED ORAL NIGHTLY
Qty: 90 TABLET | Refills: 3 | Status: SHIPPED | OUTPATIENT
Start: 2019-11-06 | End: 2020-11-23

## 2019-11-06 RX ORDER — DONEPEZIL HYDROCHLORIDE 10 MG/1
10 TABLET, FILM COATED ORAL NIGHTLY
Qty: 90 TABLET | Refills: 3 | Status: SHIPPED | OUTPATIENT
Start: 2019-11-06 | End: 2019-11-06 | Stop reason: SDUPTHER

## 2019-11-06 NOTE — PROGRESS NOTES
[facility] - NEUROLOGY  [facility] [place] [facility]    Date: November 6, 2019   Patient Name: Ishan Virgen   MRN: [id number]   PCP: Raphael Catherine  Referring Provider: Raphael Catherine MD    Assessment:      This is Ishan Virgen, 74 y.o. male with neurofibromatosis and new diagnosis Alzheimer type dementia, MRI brain images and memory labs reviewed     Plan:      -  Donepezil 10mg daily  -  Staff was advised that he should not be allowed to leave the home unsupervised    Will follow up in memory clinic and with me in 3 months       I discussed side effects of the medications. I asked the patient to  stop the medication if She notices serious adverse effects as we discussed and to seek immediate medical attention at an ER.     Hal Boogie MD  [facility]   Department of Neurology    Subjective:      HPI:   Mr. Ishan Virgen is a 74 y.o. male who presents with a chief complaint of memory loss    Patient lives at [facility] since [facility] and presents with aid from his home due to concerns of worsening cognition.  Staff reports that he used to be able to ride the bus on his own but is no longer allowed to do this as he has difficulty knowing when and how to get off.  He also commonly confuses the days.  He remains independent in ADLs but is not able to do his own laundry or prepare simple meals.    He formerly worked as an  for the [employer] but retired in March 2005.  He previously enjoyed participating in bible class and Islam choir but does not do these activities any longer and is more with withdrawn, continues to attend Islam services but spends much of his time watching TV.  He does not have any children but does have 2 brother, one of whom visits regularly and calls frequently.    PAST MEDICAL HISTORY:  Past Medical History:   Diagnosis Date    Anxiety 10/5/2014    Arthritis 10/5/2014    Chronic back pain  11/26/2015    Elevated PSA 11/7/2012    Fatigue 11/7/2012    GERD (gastroesophageal reflux disease) 10/5/2014    Iron deficiency anemia, unspecified 11/7/2012    Neurofibromatosis, type 1 (von Recklinghausen's disease) 11/7/2012    Prostate cancer 11/7/2012    Slow transit constipation 4/24/2016       PAST SURGICAL HISTORY:  Past Surgical History:   Procedure Laterality Date    RECTAL POLYPECTOMY  2011       CURRENT MEDS:  Current Outpatient Medications   Medication Sig Dispense Refill    ascorbic acid, vitamin C, (VITAMIN C) 100 MG tablet Take 100 mg by mouth once daily.      aspirin (ECOTRIN) 81 MG EC tablet Take 81 mg by mouth once daily.      atorvastatin (LIPITOR) 40 MG tablet Take 1 tablet (40 mg total) by mouth once daily. 90 tablet 3    brimonidine 0.2% (ALPHAGAN) 0.2 % Drop INSTILL 1 DROP TWICE DAILY INTO LEFT EYE  12    cyanocobalamin, vitamin B-12, (VITAMIN B-12) 50 mcg tablet Take 50 mcg by mouth once daily.      dorzolamide-timolol 2-0.5% (COSOPT) 2-0.5 % ophthalmic solution 2 (two) times daily.   1    doxycycline (VIBRAMYCIN) 100 MG Cap Take 100 mg by mouth 2 (two) times daily.  0    latanoprost 0.005 % ophthalmic solution 1 drop every evening.   0    vitamin D 1000 units Tab Take 185 mg by mouth once daily.      donepezil (ARICEPT) 10 MG tablet Take 1 tablet (10 mg total) by mouth every evening. 90 tablet 3     No current facility-administered medications for this visit.        ALLERGIES:  Review of patient's allergies indicates:  No Known Allergies    FAMILY HISTORY:  No family history on file.    SOCIAL HISTORY:  Social History     Tobacco Use    Smoking status: Never Smoker    Smokeless tobacco: Never Used   Substance Use Topics    Alcohol use: No    Drug use: Not on file       Review of Systems:  12 review of systems is negative except for the symptoms mentioned in HPI.        Objective:     Vitals:    11/06/19 1449   BP: 123/69   Pulse: 78   Weight: 74.4 kg (164 lb)  "  Height: 6' 1" (1.854 m)       General: NAD, well nourished   Eyes: no tearing, discharge, no erythema   ENT: moist mucous membranes of the oral cavity, nares patent    Neck: Supple, full range of motion  Cardiovascular: Warm and well perfused, pulses equal and symmetrical  Lungs: Normal work of breathing, normal chest wall excursions  Skin: No rash, lesions, or breakdown on exposed skin  Psychiatry: Mood and affect are appropriate   Abdomen: soft, non tender, non distended  Extremeties: No cyanosis, clubbing or edema.    Neurological   MENTAL STATUS: MOCA 13/30, difficulty following directions but interacts appropriately although fairly tangenial  CRANIAL NERVES: Visual fields intact. PERRL. Right eye closed although left EOMI. Facial sensation intact. Face symmetrical. Hearing grossly intact. Full shoulder shrug bilaterally. Tongue protrudes midline   SENSORY: Sensation is intact to light touch throughout.     MOTOR: Normal bulk and tone. No pronator drift.  5/5 deltoid, biceps, triceps, interosseous, hand  bilaterally. 5/5 iliopsoas, knee extension/flexion, foot dorsi/plantarflexion bilaterally.    REFLEXES: Symmetric and 2+ throughout. + cross adductor   CEREBELLAR/COORDINATION/GAIT: Gait steady with normal arm swing and stride length.        "

## 2019-11-06 NOTE — LETTER
November 7, 2019      Raphael Catherine MD  [address]  [address]  [facility] [address]           [facility] - Neurology  [address]  [place] [address]  Phone: [phone]  Fax: [phone]          Patient: Ishan Virgen   MR Number: [id number]   YOB: 1945   Date of Visit: 11/6/2019       Dear Dr. Raphael Catherine:    Thank you for referring Ishan Virgen to me for evaluation. Attached you will find relevant portions of my assessment and plan of care.    If you have questions, please do not hesitate to call me. I look forward to following Ishan Virgen along with you.    Sincerely,    Hal Boogie MD    Enclosure  CC:  No Recipients    If you would like to receive this communication electronically, please contact [email or web address] or [phone] to request more information on [facility] access.    For providers and/or their staff who would like to refer a patient to [facility], please contact us through our one-stop-shop provider referral line, [facility], at [phone].    If you feel you have received this communication in error or would no longer like to receive these types of communications, please e-mail [email or web address]

## 2019-11-06 NOTE — PATIENT INSTRUCTIONS
TAKE DONEPEZIL 1 TAB DAILY    YOU WILL BE CONTACTED ABOUT APPOINTMENT IN MEMORY CLINIC    FOLLOW UP WITH DR MON IN 3 MONTHS

## 2019-11-24 ENCOUNTER — HOSPITAL ENCOUNTER (EMERGENCY)
Facility: HOSPITAL | Age: 74
Discharge: HOME | End: 2019-11-25
Attending: EMERGENCY MEDICINE | Admitting: OPHTHALMOLOGY
Payer: MEDICARE

## 2019-11-24 DIAGNOSIS — H40.9 GLAUCOMA OF LEFT EYE, UNSPECIFIED GLAUCOMA TYPE: ICD-10-CM

## 2019-11-24 DIAGNOSIS — H53.8 BLURRY VISION, LEFT EYE: ICD-10-CM

## 2019-11-24 DIAGNOSIS — H53.9 VISION CHANGES: Primary | ICD-10-CM

## 2019-11-24 DIAGNOSIS — R42 DIZZINESS: ICD-10-CM

## 2019-11-24 DIAGNOSIS — H04.122 DRY EYE OF LEFT SIDE: ICD-10-CM

## 2019-11-24 LAB
ALBUMIN SERPL BCP-MCNC: 4 G/DL (ref 3.5–5.2)
ALP SERPL-CCNC: 88 U/L (ref 55–135)
ALT SERPL W/O P-5'-P-CCNC: 14 U/L (ref 10–44)
ANION GAP SERPL CALC-SCNC: 10 MMOL/L (ref 8–16)
AST SERPL-CCNC: 14 U/L (ref 10–40)
BASOPHILS # BLD AUTO: 0.03 K/UL (ref 0–0.2)
BASOPHILS NFR BLD: 0.5 % (ref 0–1.9)
BILIRUB SERPL-MCNC: 0.6 MG/DL (ref 0.1–1)
BILIRUB UR QL STRIP: NEGATIVE
BUN SERPL-MCNC: 17 MG/DL (ref 8–23)
CALCIUM SERPL-MCNC: 9.9 MG/DL (ref 8.7–10.5)
CHLORIDE SERPL-SCNC: 103 MMOL/L (ref 95–110)
CLARITY UR: CLEAR
CO2 SERPL-SCNC: 28 MMOL/L (ref 23–29)
COLOR UR: YELLOW
CREAT SERPL-MCNC: 0.9 MG/DL (ref 0.5–1.4)
DIFFERENTIAL METHOD: ABNORMAL
EOSINOPHIL # BLD AUTO: 0.1 K/UL (ref 0–0.5)
EOSINOPHIL NFR BLD: 0.8 % (ref 0–8)
ERYTHROCYTE [DISTWIDTH] IN BLOOD BY AUTOMATED COUNT: 12.8 % (ref 11.5–14.5)
EST. GFR  (AFRICAN AMERICAN): >60 ML/MIN/1.73 M^2
EST. GFR  (NON AFRICAN AMERICAN): >60 ML/MIN/1.73 M^2
GLUCOSE SERPL-MCNC: 100 MG/DL (ref 70–110)
GLUCOSE UR QL STRIP: NEGATIVE
HCT VFR BLD AUTO: 45.9 % (ref 40–54)
HGB BLD-MCNC: 14.6 G/DL (ref 14–18)
HGB UR QL STRIP: ABNORMAL
IMM GRANULOCYTES # BLD AUTO: 0.02 K/UL (ref 0–0.04)
IMM GRANULOCYTES NFR BLD AUTO: 0.3 % (ref 0–0.5)
KETONES UR QL STRIP: NEGATIVE
LEUKOCYTE ESTERASE UR QL STRIP: NEGATIVE
LYMPHOCYTES # BLD AUTO: 1.1 K/UL (ref 1–4.8)
LYMPHOCYTES NFR BLD: 18.6 % (ref 18–48)
MCH RBC QN AUTO: 31.1 PG (ref 27–31)
MCHC RBC AUTO-ENTMCNC: 31.8 G/DL (ref 32–36)
MCV RBC AUTO: 98 FL (ref 82–98)
MONOCYTES # BLD AUTO: 0.5 K/UL (ref 0.3–1)
MONOCYTES NFR BLD: 8.1 % (ref 4–15)
NEUTROPHILS # BLD AUTO: 4.3 K/UL (ref 1.8–7.7)
NEUTROPHILS NFR BLD: 71.7 % (ref 38–73)
NITRITE UR QL STRIP: NEGATIVE
NRBC BLD-RTO: 0 /100 WBC
PH UR STRIP: 6 [PH] (ref 5–8)
PLATELET # BLD AUTO: 320 K/UL (ref 150–350)
PMV BLD AUTO: 8.7 FL (ref 9.2–12.9)
POCT GLUCOSE: 102 MG/DL (ref 70–110)
POTASSIUM SERPL-SCNC: 4.4 MMOL/L (ref 3.5–5.1)
PROT SERPL-MCNC: 8.7 G/DL (ref 6–8.4)
PROT UR QL STRIP: NEGATIVE
RBC # BLD AUTO: 4.7 M/UL (ref 4.6–6.2)
SODIUM SERPL-SCNC: 141 MMOL/L (ref 136–145)
SP GR UR STRIP: 1.01 (ref 1–1.03)
URN SPEC COLLECT METH UR: ABNORMAL
UROBILINOGEN UR STRIP-ACNC: ABNORMAL EU/DL
WBC # BLD AUTO: 6.06 K/UL (ref 3.9–12.7)

## 2019-11-24 PROCEDURE — 85025 COMPLETE CBC W/AUTO DIFF WBC: CPT

## 2019-11-24 PROCEDURE — 81003 URINALYSIS AUTO W/O SCOPE: CPT

## 2019-11-24 PROCEDURE — 25000003 PHARM REV CODE 250: Performed by: EMERGENCY MEDICINE

## 2019-11-24 PROCEDURE — 36415 COLL VENOUS BLD VENIPUNCTURE: CPT

## 2019-11-24 PROCEDURE — 80053 COMPREHEN METABOLIC PANEL: CPT

## 2019-11-24 RX ORDER — DORZOLAMIDE HYDROCHLORIDE AND TIMOLOL MALEATE 20; 5 MG/ML; MG/ML
1 SOLUTION/ DROPS OPHTHALMIC
Status: COMPLETED | OUTPATIENT
Start: 2019-11-25 | End: 2019-11-25

## 2019-11-24 RX ORDER — PROPARACAINE HYDROCHLORIDE 5 MG/ML
1 SOLUTION/ DROPS OPHTHALMIC
Status: COMPLETED | OUTPATIENT
Start: 2019-11-24 | End: 2019-11-24

## 2019-11-24 RX ADMIN — PROPARACAINE HYDROCHLORIDE 1 DROP: 5 SOLUTION/ DROPS OPHTHALMIC at 11:11

## 2019-11-25 VITALS
TEMPERATURE: 99 F | HEART RATE: 80 BPM | SYSTOLIC BLOOD PRESSURE: 152 MMHG | OXYGEN SATURATION: 98 % | DIASTOLIC BLOOD PRESSURE: 88 MMHG | BODY MASS INDEX: 21.64 KG/M2 | WEIGHT: 164 LBS | RESPIRATION RATE: 20 BRPM

## 2019-11-25 PROCEDURE — 25000003 PHARM REV CODE 250: Performed by: EMERGENCY MEDICINE

## 2019-11-25 PROCEDURE — 82962 GLUCOSE BLOOD TEST: CPT

## 2019-11-25 PROCEDURE — 99284 EMERGENCY DEPT VISIT MOD MDM: CPT | Mod: 25

## 2019-11-25 RX ORDER — PROPARACAINE HYDROCHLORIDE 5 MG/ML
1 SOLUTION/ DROPS OPHTHALMIC
Status: COMPLETED | OUTPATIENT
Start: 2019-11-25 | End: 2019-11-25

## 2019-11-25 RX ADMIN — PROPARACAINE HYDROCHLORIDE 1 DROP: 5 SOLUTION/ DROPS OPHTHALMIC at 01:11

## 2019-11-25 RX ADMIN — DORZOLAMIDE HYDROCHLORIDE AND TIMOLOL MALEATE 1 DROP: 20; 5 SOLUTION/ DROPS OPHTHALMIC at 12:11

## 2019-11-25 NOTE — DISCHARGE INSTRUCTIONS
Diagnosis: dry eye    Take the prescribed medications as directed.    Follow-Up Plan:  - Follow-up with your ophthalmologist  - Additional testing and/or evaluation as directed by your eye doctor    Return to the Emergency Department for symptoms including but not limited to: worsening symptoms, shortness of breath or chest pain, vomiting with inability to hold down fluids, fevers greater than 100.4°F, passing out/fainting/unconsciousness, or other concerning symptoms.

## 2019-11-25 NOTE — ED NOTES
LOC: The patient is awake, alert, and oriented to place, time, situation. Affect is appropriate.  Speech is appropriate and clear.     APPEARANCE: Patient resting comfortably in no acute distress.  Patient is clean and well groomed.    SKIN: The skin is warm and dry; color consistent with ethnicity.  Patient has normal skin turgor and moist mucus membranes.  Pt has tumor skin disorder, lesions cephal-caudal noted.      MUSCULOSKELETAL: Patient moving upper and lower extremities without difficulty.  Denies weakness.     RESPIRATORY: Airway is open and patent. Respirations spontaneous, even, easy, and non-labored.  Patient has a normal effort and rate.  No accessory muscle use noted. Denies cough.     CARDIAC:  Normal rhythm and rate noted.  No peripheral edema noted. No complaints of chest pain.      ABDOMEN: Soft and non tender to palpation.  No distention noted.     NEUROLOGIC: Eyes open spontaneously.  Behavior appropriate to situation.  Follows commands; facial expression symmetrical.  Purposeful motor response noted; normal sensation in all extremities. Pt has hx of right eye glaucoma unable to see out of right eye, Pt unable to see out of left eye.

## 2019-11-25 NOTE — CONSULTS
"Consultation Report  Ophthalmology Service    Date: 11/25/2019    Chief complaint/Reason for Consult: intermittent vision loss, left eye     History of Present Illness: Ishan Virgen is a 74 y.o. male with PMHx of NF Type 1, late onset alzheimer, and glaucoma who presents with intermittent blurred vision and pain in the left eye for 1 week. Patient is a poor historian and caregiver from long term care facility at bedside does not know much of medical history. The patient reports the vision sometimes "dims" and he feels like he cannot see at times and this is associated with achy eye pain. He also reports some floaters but denies flashes and double vision. The caregiver at bedside reports today he was doing okay until later this evening when he had a "Crying spell" and they brought him to the ED for evaluation. The patient is monocular since childhood for unknown reason, although patient says 2/2 NF.    POcularHx: Hx of vision loss as a child from NF, Glaucoma left eye, CEIOL left eye 3 years ago. Wears bifocals.    Current eye gtts: Brimonidine TID left eye, Latanoprost QHS left eye    PMHx:  has a past medical history of Anxiety (10/5/2014), Arthritis (10/5/2014), Chronic back pain (11/26/2015), Elevated PSA (11/7/2012), Fatigue (11/7/2012), GERD (gastroesophageal reflux disease) (10/5/2014), Iron deficiency anemia, unspecified (11/7/2012), Neurofibromatosis, type 1 (von Recklinghausen's disease) (11/7/2012), Prostate cancer (11/7/2012), and Slow transit constipation (4/24/2016).     PSurgHx:  has a past surgical history that includes Rectal polypectomy (2011).     Home Medications:   Prior to Admission medications    Medication Sig Start Date End Date Taking? Authorizing Provider   ascorbic acid, vitamin C, (VITAMIN C) 100 MG tablet Take 100 mg by mouth once daily.   Yes Historical Provider, MD   aspirin (ECOTRIN) 81 MG EC tablet Take 81 mg by mouth once daily.   Yes Historical Provider, MD   brimonidine 0.2% " (ALPHAGAN) 0.2 % Drop INSTILL 1 DROP TWICE DAILY INTO LEFT EYE 7/2/19  Yes Historical Provider, MD   cyanocobalamin, vitamin B-12, (VITAMIN B-12) 50 mcg tablet Take 50 mcg by mouth once daily.   Yes Historical Provider, MD   donepezil (ARICEPT) 10 MG tablet Take 1 tablet (10 mg total) by mouth every evening. 11/6/19 11/5/20 Yes Hal Boogie MD   dorzolamide-timolol 2-0.5% (COSOPT) 2-0.5 % ophthalmic solution 2 (two) times daily.  7/28/14  Yes Historical Provider, MD   latanoprost 0.005 % ophthalmic solution 1 drop every evening.  9/12/14  Yes Historical Provider, MD   vitamin D 1000 units Tab Take 185 mg by mouth once daily.   Yes Historical Provider, MD   atorvastatin (LIPITOR) 40 MG tablet Take 1 tablet (40 mg total) by mouth once daily. 7/23/19 7/22/20  Raphael Catherine MD        Medications this encounter:       Allergies: has No Known Allergies.     Social:  reports that he has never smoked. He has never used smokeless tobacco. He reports that he does not drink alcohol or use drugs.     Family Hx: No family history of glaucoma, macular degeneration, or blindness. family history is not on file.     ROS: Negative x 10 except for complaints as described in HPI; negative for fever, chills, weight loss, nausea, vomiting, diarrhea, shortness of breath, nasal discharge, cough, abdominal pain, dyspnea, difficulty moving arms and legs, confusion, dysuria, palpitations, or chest pain     Ocular examination/Dilated fundus examination:  Base Eye Exam     Visual Acuity (Snellen - Linear)       Right Left    Dist cc NLP 20/25 -2          Tonometry (Tonopen, 2:52 AM)       Right Left    Pressure 12 9          Pupils       Dark Light Shape React APD    Right         Left 4 2 Round Brisk None          Visual Fields       Right Left      Full    Restrictions Total superior temporal, inferior temporal, superior nasal, inferior nasal deficiencies           Extraocular Movement       Right Left      Full     -- -- --   --   --   -- -- --    -- -- --   --  --   -- -- --             Dilation     Both eyes:  1% Mydriacyl, 2.5% Phenylephrine @ 2:53 AM            Slit Lamp and Fundus Exam     External Exam       Right Left    External Numerous neurofibromas on face and body Numerous neurofibromas on face and body          Slit Lamp Exam       Right Left    Lids/Lashes Complete ptosis, neurofibromas neurofibromas upper lid, multiple on lower lid, lower lid ectropion    Conjunctiva/Sclera injection, superior symblepharon trace injection    Cornea Haze PEE mostly interpalpebral    Anterior Chamber Grossly deep Deep and quiet    Iris Hazy view Round and pharm dilated    Lens No view PCIOL    Vitreous No view Normal          Fundus Exam       Right Left    Disc No view PPA, nasalization fo vessels, thin rim    C/D Ratio  0.8    Macula no view flat, good FLR    Vessels no view Normal    Periphery no view Normal              Assessment/Plan:   1. Dry eye syndrome, 2/2 ectropion and lid lag from neurofibromas, left eye  - Recommend the following treatment:   Artificial tears- Place 1 drop in the left eye four times per day   Ophthalmic ointment- Pull lower eyelid down and place small ribbon of ointment into  left lower eyelid at bedtime   - Please be sure to wait at least 5 minutes between placing artificial tears and  glaucoma drops  - Patient should follow up with Dr. Sumner within 1-2 weeks    2. Glaucoma, left eye  - Continue Brimonidine and latanoprost as prescribed by Dr. Sumner    3. Monocular status  - Monocular precautions discussed.   - Pt should wear full coverage shatter resistant glasses at all times    If there are further questions, please page the on call ophthalmology resident.    Parris Man MD  PGY2, Ophthalmology Resident  11/25/2019  2:46 AM    I have reviewed the history and exam of the patient and agree with the resident's exam, assessment and plan.

## 2019-11-25 NOTE — ED TRIAGE NOTES
Pt presents to ED with c/o dizziness. Pt's caregiver reports pt was experiencing blurred vision and dizziness x1 week. Reports pt was walking with an unsteady gait yesterday but says pt has had a steady gait today. Reports some worsening confusion since being Dx with alzhiemers. Denies fever, chest pain, SOB, N/V/D, and dysuria

## 2019-11-25 NOTE — ED PROVIDER NOTES
"Encounter Date: 11/24/2019    SCRIBE #1 NOTE: I, Mely Alanis, am scribing for, and in the presence of, Dr. Mckeon.       History     Chief Complaint   Patient presents with    Dizziness     dizziness and blurred vision earlier today. Currently denies symptoms. Denies chest pain, sob     Time seen by provider: 7:36 PM    This is a 74 y.o. 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 No chest pain/SOB/diaphoresis. Pt denies sx currently. Per caregiver, pt had an altered gait yesterday, but not today. Per caregiver, pt has been increasingly confused since alzheimer's diagnosis. Per caregiver, pt lives in a group home, and now has to be frequently reminded to go and eat during meal hours, which was not the case a week ago. Per caregiver, pt has also been having increasing "crying spells" since his alzheimer's diagnosis. Pt denies recent changes in home medication He reports no hx of DM or HTN.     The history is provided by the patient and a caregiver.     Review of patient's allergies indicates:  No Known Allergies  Past Medical History:   Diagnosis Date    Anxiety 10/5/2014    Arthritis 10/5/2014    Chronic back pain 11/26/2015    Elevated PSA 11/7/2012    Fatigue 11/7/2012    GERD (gastroesophageal reflux disease) 10/5/2014    Iron deficiency anemia, unspecified 11/7/2012    Neurofibromatosis, type 1 (von Recklinghausen's disease) 11/7/2012    Prostate cancer 11/7/2012    Slow transit constipation 4/24/2016     Past Surgical History:   Procedure Laterality Date    RECTAL POLYPECTOMY  2011     History reviewed. No pertinent family history.  Social History     Tobacco Use    Smoking status: Never Smoker    Smokeless tobacco: Never Used   Substance Use Topics    Alcohol use: No    Drug use: Never     Review of Systems   Constitutional: " Negative for chills and fever.   HENT: Negative for congestion and sore throat.    Eyes: Positive for visual disturbance. Negative for photophobia and redness.   Respiratory: Negative for cough and shortness of breath.    Cardiovascular: Negative for chest pain.   Gastrointestinal: Negative for abdominal pain, nausea and vomiting.   Genitourinary: Negative for dysuria.   Musculoskeletal: Positive for gait problem. Negative for back pain.   Skin: Negative for rash.   Neurological: Positive for dizziness. Negative for weakness, light-headedness and headaches.   Psychiatric/Behavioral: Positive for confusion.       Physical Exam     Initial Vitals [11/24/19 1910]   BP Pulse Resp Temp SpO2   (!) 163/74 74 16 98 °F (36.7 °C) 100 %      MAP       --         Physical Exam    Nursing note and vitals reviewed.  Constitutional: He appears well-developed and well-nourished. He is not diaphoretic. No distress.   HENT:   Head: Normocephalic and atraumatic.   Mouth/Throat: Oropharynx is clear and moist.   Eyes:   Chronic vision loss to R eye. Left pupil round and reactive to light. Conjunctivae are pink, clear, and intact. Intraocular pressure by Adan-Pen 35 mm Hg.    Neck: Normal range of motion. Neck supple.   Cardiovascular: Normal rate, regular rhythm, S1 normal, S2 normal and normal heart sounds. Exam reveals no gallop and no friction rub.    No murmur heard.  Pulmonary/Chest: Breath sounds normal. No respiratory distress. He has no wheezes. He has no rhonchi. He has no rales.   Abdominal: Soft. Bowel sounds are normal. There is no tenderness. There is no rebound and no guarding.   Musculoskeletal: Normal range of motion. He exhibits no edema or tenderness.   No lower extremity edema.    Lymphadenopathy:     He has no cervical adenopathy.   Neurological: He is alert and oriented to person, place, and time.   Cranial nerves II-XII intact. Strength 5/5 throughout. Reflexes 2+ throughout. Good finger to nose task ability.  Negative pronator drift. No meningeal signs. No papilledema. PERRLA, EOMI.   Skin: Skin is warm and dry. Capillary refill takes less than 2 seconds. No rash noted. No pallor.   Neurofibromatosis.    Psychiatric: He has a normal mood and affect. His behavior is normal. Judgment and thought content normal.         ED Course   Procedures  Labs Reviewed   URINALYSIS - Abnormal; Notable for the following components:       Result Value    Occult Blood UA Trace (*)     Urobilinogen, UA 2.0-3.0 (*)     All other components within normal limits   CBC W/ AUTO DIFFERENTIAL - Abnormal; Notable for the following components:    Mean Corpuscular Hemoglobin 31.1 (*)     Mean Corpuscular Hemoglobin Conc 31.8 (*)     MPV 8.7 (*)     All other components within normal limits    Narrative:     Collection has been rescheduled by AS4 at 11/24/2019 20:59 Reason:   Patient unavailable. Gone to cat scan    COMPREHENSIVE METABOLIC PANEL - Abnormal; Notable for the following components:    Total Protein 8.7 (*)     All other components within normal limits    Narrative:     Collection has been rescheduled by AS4 at 11/24/2019 20:59 Reason:   Patient unavailable. Gone to cat scan    POCT GLUCOSE   POCT GLUCOSE MONITORING CONTINUOUS          Imaging Results          CT Head Without Contrast (Final result)  Result time 11/24/19 21:25:04    Final result by Sanford Thomas MD (11/24/19 21:25:04)                 Impression:      No acute intracranial abnormalities identified.  No significant detrimental change compared to previous CT head from 02/04/2019.      Electronically signed by: Sanford Thomas MD  Date:    11/24/2019  Time:    21:25             Narrative:    EXAMINATION:  CT HEAD WITHOUT CONTRAST    CLINICAL HISTORY:  Dizziness;    TECHNIQUE:  Low dose axial images were obtained through the head.  Coronal and sagittal reformations were also performed. Contrast was not administered.    COMPARISON:  CT head from  02/04/2019.    FINDINGS:  There is mild microvascular ischemic change.  Mild asymmetric volume loss and encephalomalacia are seen involving the anterior aspect of the right temporal.  Adjacent grossly stable abnormal appearance of the right orbit with absence of the posterior orbital roof and absence of the greater wing of the sphenoid. No evidence of acute/recent major vascular distribution cerebral infarction, intraparenchymal hemorrhage, or intra-axial space occupying lesion. The ventricular system is stable in size and configuration with no evidence of hydrocephalus. No effacement of the skull-base cisterns. No abnormal extra-axial fluid collections or blood products. Visualized paranasal sinuses and mastoid air cells are clear. The calvarium shows no acute abnormality.  Numerous cutaneous skin lesions are seen consistent with neurofibromatosis.                                 Medical Decision Making:   History:   Old Medical Records: I decided to obtain old medical records.  Clinical Tests:   Lab Tests: Ordered and Reviewed  Radiological Study: Ordered and Reviewed            Scribe Attestation:   Scribe #1: I performed the above scribed service and the documentation accurately describes the services I performed. I attest to the accuracy of the note.    Attending Attestation:           Physician Attestation for Scribe:  Physician Attestation Statement for Scribe #1: I, Dr. Mckeon, reviewed documentation, as scribed by Mely Alanis in my presence, and it is both accurate and complete.                 ED Course as of Nov 24 2355   Sun Nov 24, 2019   2346 Called transfer center to request to speak with Opthalmology.    [TA]   2350 Consulted and discussed with on-call Opthalmology who accepted the pt for ED to ED. Pt will be transferred for opthalmology evaluation.     [TA]      ED Course User Index  [TA] Mely Alanis                Clinical Impression:   No diagnosis found.

## 2019-11-25 NOTE — ED TRIAGE NOTES
Pt transfer for Optho services. Right eye glaucoma unable to see, now having same sx in left eye. AAOx4. In no acute distress.

## 2019-11-25 NOTE — ED PROVIDER NOTES
Encounter Date: 11/24/2019       History     Chief Complaint   Patient presents with    Transfer- Optho     transfer from [facility] for optho consult     Mr Virgen is a 74 y.o. 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 He notes that this typically may happen intermittently but that he developed L eye pain as well which prompted him to go to the hospital. He notes that his last opthalmology appointment was in October. He denies any fever ,chills, n/v, dysphagia, cp, abd pain, facial pain or focal numbness/weakness. He denies any recent falls or head trauma. Patient currently lives in a group home and has had increasing frequent episodes of foretfulness and sadness following diagnosis of alzheimers. He additionally was noted by his caregiver to have had an altered gait the day prior to arrival at the hospital which had resolved by the next day. Patient has no current recollection of this event.                Review of patient's allergies indicates:  No Known Allergies  Past Medical History:   Diagnosis Date    Anxiety 10/5/2014    Arthritis 10/5/2014    Chronic back pain 11/26/2015    Elevated PSA 11/7/2012    Fatigue 11/7/2012    GERD (gastroesophageal reflux disease) 10/5/2014    Iron deficiency anemia, unspecified 11/7/2012    Neurofibromatosis, type 1 (von Recklinghausen's disease) 11/7/2012    Prostate cancer 11/7/2012    Slow transit constipation 4/24/2016     Past Surgical History:   Procedure Laterality Date    RECTAL POLYPECTOMY  2011     History reviewed. No pertinent family history.  Social History     Tobacco Use    Smoking status: Never Smoker    Smokeless tobacco: Never Used   Substance Use Topics    Alcohol use: No    Drug use: Never     Review of Systems    Constitutional: Negative for chills, diaphoresis and fever.   HENT: Negative for facial swelling, rhinorrhea and sneezing.         Chronic neurofibromas and R eye vision loss    Eyes: Positive for pain and visual disturbance (blurred vision with some darkness). Negative for photophobia, discharge and redness (L eye, intermittent; resolved currenty).   Respiratory: Negative for cough, shortness of breath and wheezing.    Cardiovascular: Negative for chest pain, palpitations and leg swelling.   Gastrointestinal: Negative for abdominal distention, abdominal pain, nausea and vomiting.   Genitourinary: Negative for dysuria, flank pain and hematuria.   Musculoskeletal: Positive for gait problem (reported per nursing home). Negative for myalgias.   Skin: Positive for color change (chronic) and rash (chronic neurofibromas). Negative for pallor.   Neurological: Positive for dizziness (intermittent) and headaches (intermittent R temporal). Negative for syncope and weakness.   Psychiatric/Behavioral: Positive for confusion and decreased concentration.       Physical Exam     Initial Vitals [11/24/19 1910]   BP Pulse Resp Temp SpO2   (!) 163/74 74 16 98 °F (36.7 °C) 100 %      MAP       --         Physical Exam    Constitutional: He appears well-developed and well-nourished. He is not diaphoretic. No distress.   HENT:   Head: Normocephalic and atraumatic.   Chronic neurofibromas. R eye will not open voluntarily     Eyes: Conjunctivae are normal. Right eye exhibits no discharge. Left eye exhibits no discharge. No scleral icterus.   R eye will not open voluntarily. L eye with blurred vision but intact peripheral vision and central vision.   Neck: Normal range of motion. No JVD present.   Cardiovascular: Normal rate, regular rhythm, normal heart sounds and intact distal pulses. Exam reveals no friction rub.    No murmur heard.  Pulmonary/Chest: Breath sounds normal. No respiratory distress. He has no wheezes. He has no rales.    Abdominal: Soft. Bowel sounds are normal. He exhibits no distension. There is no tenderness. There is no rebound.   Musculoskeletal: Normal range of motion. He exhibits no edema.   Neurological: He is alert and oriented to person, place, and time.   Skin: Skin is warm and dry.         ED Course   Procedures  Labs Reviewed   URINALYSIS - Abnormal; Notable for the following components:       Result Value    Occult Blood UA Trace (*)     Urobilinogen, UA 2.0-3.0 (*)     All other components within normal limits   CBC W/ AUTO DIFFERENTIAL - Abnormal; Notable for the following components:    Mean Corpuscular Hemoglobin 31.1 (*)     Mean Corpuscular Hemoglobin Conc 31.8 (*)     MPV 8.7 (*)     All other components within normal limits    Narrative:     Collection has been rescheduled by AS4 at 11/24/2019 20:59 Reason:   Patient unavailable. Gone to cat scan    COMPREHENSIVE METABOLIC PANEL - Abnormal; Notable for the following components:    Total Protein 8.7 (*)     All other components within normal limits    Narrative:     Collection has been rescheduled by AS4 at 11/24/2019 20:59 Reason:   Patient unavailable. Gone to cat scan    POCT GLUCOSE   POCT GLUCOSE MONITORING CONTINUOUS          Imaging Results          CT Head Without Contrast (Final result)  Result time 11/24/19 21:25:04    Final result by Sanford Thomas MD (11/24/19 21:25:04)                 Impression:      No acute intracranial abnormalities identified.  No significant detrimental change compared to previous CT head from 02/04/2019.      Electronically signed by: Sanford Thomas MD  Date:    11/24/2019  Time:    21:25             Narrative:    EXAMINATION:  CT HEAD WITHOUT CONTRAST    CLINICAL HISTORY:  Dizziness;    TECHNIQUE:  Low dose axial images were obtained through the head.  Coronal and sagittal reformations were also performed. Contrast was not administered.    COMPARISON:  CT head from 02/04/2019.    FINDINGS:  There is mild microvascular  ischemic change.  Mild asymmetric volume loss and encephalomalacia are seen involving the anterior aspect of the right temporal.  Adjacent grossly stable abnormal appearance of the right orbit with absence of the posterior orbital roof and absence of the greater wing of the sphenoid. No evidence of acute/recent major vascular distribution cerebral infarction, intraparenchymal hemorrhage, or intra-axial space occupying lesion. The ventricular system is stable in size and configuration with no evidence of hydrocephalus. No effacement of the skull-base cisterns. No abnormal extra-axial fluid collections or blood products. Visualized paranasal sinuses and mastoid air cells are clear. The calvarium shows no acute abnormality.  Numerous cutaneous skin lesions are seen consistent with neurofibromatosis.                                 Medical Decision Making:   Initial Assessment:   Patient presented with one to two day history of and dizziness. Family at bedside notes that intermittent L eye blurred vision has been more chronic over past 2 weeks but that pain is more recent over past 2 days which led them to come to hospital. Otehrwise not tenderness to palpation aroudn orbit or on temples or jaw.Denies jaw claudication or HA currently but admits to occasional R temporal OROSCO   Differential Diagnosis:   Glaucoma v vasculitis v chronic macular changes v NF changes  Clinical Tests:   Lab Tests: Reviewed  Radiological Study: Reviewed  ED Management:  Patients previous labs and imaging from outside hospital was reviewed.  CBC, CMP, glucose and UA were unimpressive for current symptoms. CT head demonstrated no acute intracranial abnormalities identified.  No significant detrimental change compared to previous CT head from 02/04/2019.     Opthamology consulted for recommendations. Pending at sign out.  Patient signed out to Dr Hernandez at 2:10 AM.   Other:   I have discussed this case with another health care provider.       <>  Summary of the Discussion: Opthomology consulted. Recs pending.  Opthamology consulted for recommendations.              Attending Attestation:   Physician Attestation Statement for Resident:  As the supervising MD   Physician Attestation Statement: I have personally seen and examined this patient.   I agree with the above history. -:   As the supervising MD I agree with the above PE.    As the supervising MD I agree with the above treatment, course, plan, and disposition.   -:     Ophtho evaluated patient at bedside, evaluation is consistent with chronic end-stage glaucoma and severe dry eye.  Plan for artificial tears q.i.d. throughout the day, and Lacri-Lube at night.  Patient to follow up with her ophthalmologist.                    ED Course as of Nov 25 0121   Sun Nov 24, 2019   2346 Called transfer center to request to speak with Opthalmology.    [TA]   2350 Consulted and discussed case with on-call Opthalmology who accepted the pt for ED to ED. Pt will be transferred for opthalmology evaluation.     [TA]      ED Course User Index  [TA] Mely Alanis                Clinical Impression:       ICD-10-CM ICD-9-CM   1. Vision changes H53.9 368.9   2. Glaucoma of left eye, unspecified glaucoma type H40.9 365.9   3. Blurry vision, left eye H53.8 368.8   4. Dizziness R42 780.4                             Fidel Florez MD  Resident  11/25/19 [id number]       Theresa Hernandez MD  11/25/19 [id number]

## 2019-12-13 ENCOUNTER — TELEPHONE (OUTPATIENT)
Dept: INTERNAL MEDICINE | Facility: CLINIC | Age: 74
End: 2019-12-13

## 2020-01-08 ENCOUNTER — TELEPHONE (OUTPATIENT)
Dept: PRIMARY CARE CLINIC | Facility: CLINIC | Age: 75
End: 2020-01-08

## 2020-01-08 NOTE — TELEPHONE ENCOUNTER
----- Message from Sherley Parks sent at 1/8/2020 12:05 PM CST -----  Contact: Dr. Marcelo Virgen (brother)  Type: Patient Call Back    Who called: Dr. Marcelo Virgen (brother)    What is the request in detail: Dr. Virgen is requesting a call back. He states that he would like to discuss possible getting a referral for a dermatologist. He states that his brother has some boils covering his body and they are now leaking and he would like his brother to be seen soon.   Please contact to further advise.    Can the clinic reply by [facility]? No    Best call back number: [phone]    Additional Information: N/A

## 2020-02-06 ENCOUNTER — TELEPHONE (OUTPATIENT)
Dept: DERMATOLOGY | Facility: CLINIC | Age: 75
End: 2020-02-06

## 2020-02-06 NOTE — TELEPHONE ENCOUNTER
----- Message from Jazmyn Marinelli sent at 2/6/2020  9:13 AM CST -----  Contact: Brother [phone]  New Patient would like to speak with you about being seen this week for moles that are leaking. Patient can only come around 11am with his caregiver. Please advise

## 2020-02-13 ENCOUNTER — PATIENT OUTREACH (OUTPATIENT)
Dept: ADMINISTRATIVE | Facility: OTHER | Age: 75
End: 2020-02-13

## 2020-02-14 ENCOUNTER — INITIAL CONSULT (OUTPATIENT)
Dept: DERMATOLOGY | Facility: CLINIC | Age: 75
End: 2020-02-14
Payer: MEDICARE

## 2020-02-14 DIAGNOSIS — Q85.00 NF (NEUROFIBROMATOSIS): ICD-10-CM

## 2020-02-14 DIAGNOSIS — L08.9 SKIN INFECTION: Primary | ICD-10-CM

## 2020-02-14 DIAGNOSIS — D48.5 NEOPLASM OF UNCERTAIN BEHAVIOR OF SKIN: ICD-10-CM

## 2020-02-14 PROCEDURE — 88342 IMHCHEM/IMCYTCHM 1ST ANTB: CPT | Mod: HCWC | Performed by: PATHOLOGY

## 2020-02-14 PROCEDURE — 87186 SC STD MICRODIL/AGAR DIL: CPT | Mod: 59,HCWC

## 2020-02-14 PROCEDURE — 87077 CULTURE AEROBIC IDENTIFY: CPT | Mod: 59,HCWC

## 2020-02-14 PROCEDURE — 88305 TISSUE EXAM BY PATHOLOGIST: CPT | Mod: HCWC | Performed by: PATHOLOGY

## 2020-02-14 PROCEDURE — 87070 CULTURE OTHR SPECIMN AEROBIC: CPT | Mod: HCWC

## 2020-02-14 PROCEDURE — 88342 CHG IMMUNOCYTOCHEMISTRY: ICD-10-PCS | Mod: 26,HCWC,, | Performed by: PATHOLOGY

## 2020-02-14 PROCEDURE — 88305 TISSUE EXAM BY PATHOLOGIST: CPT | Mod: 26,HCWC,, | Performed by: PATHOLOGY

## 2020-02-14 PROCEDURE — 1126F PR PAIN SEVERITY QUANTIFIED, NO PAIN PRESENT: ICD-10-PCS | Mod: HCWC,S$GLB,, | Performed by: DERMATOLOGY

## 2020-02-14 PROCEDURE — 1101F PT FALLS ASSESS-DOCD LE1/YR: CPT | Mod: HCWC,CPTII,S$GLB, | Performed by: DERMATOLOGY

## 2020-02-14 PROCEDURE — 88342 IMHCHEM/IMCYTCHM 1ST ANTB: CPT | Mod: 26,HCWC,, | Performed by: PATHOLOGY

## 2020-02-14 PROCEDURE — 99999 PR PBB SHADOW E&M-EST. PATIENT-LVL III: CPT | Mod: PBBFAC,HCWC,, | Performed by: DERMATOLOGY

## 2020-02-14 PROCEDURE — 99202 OFFICE O/P NEW SF 15 MIN: CPT | Mod: HCWC,S$GLB,, | Performed by: DERMATOLOGY

## 2020-02-14 PROCEDURE — 1101F PR PT FALLS ASSESS DOC 0-1 FALLS W/OUT INJ PAST YR: ICD-10-PCS | Mod: HCWC,CPTII,S$GLB, | Performed by: DERMATOLOGY

## 2020-02-14 PROCEDURE — 1126F AMNT PAIN NOTED NONE PRSNT: CPT | Mod: HCWC,S$GLB,, | Performed by: DERMATOLOGY

## 2020-02-14 PROCEDURE — 99999 PR PBB SHADOW E&M-EST. PATIENT-LVL III: ICD-10-PCS | Mod: PBBFAC,HCWC,, | Performed by: DERMATOLOGY

## 2020-02-14 PROCEDURE — 99202 PR OFFICE/OUTPT VISIT, NEW, LEVL II, 15-29 MIN: ICD-10-PCS | Mod: HCWC,S$GLB,, | Performed by: DERMATOLOGY

## 2020-02-14 PROCEDURE — 1159F PR MEDICATION LIST DOCUMENTED IN MEDICAL RECORD: ICD-10-PCS | Mod: HCWC,S$GLB,, | Performed by: DERMATOLOGY

## 2020-02-14 PROCEDURE — 1159F MED LIST DOCD IN RCRD: CPT | Mod: HCWC,S$GLB,, | Performed by: DERMATOLOGY

## 2020-02-14 PROCEDURE — 88305 TISSUE EXAM BY PATHOLOGIST: ICD-10-PCS | Mod: 26,HCWC,, | Performed by: PATHOLOGY

## 2020-02-14 RX ORDER — CLINDAMYCIN PHOSPHATE 11.9 MG/ML
SOLUTION TOPICAL
Qty: 60 ML | Refills: 3 | Status: SHIPPED | OUTPATIENT
Start: 2020-02-14 | End: 2020-02-27 | Stop reason: SDUPTHER

## 2020-02-14 NOTE — PROGRESS NOTES
Subjective:       Patient ID:  Ishan Virgen is a 74 y.o. male who presents for   Chief Complaint   Patient presents with    Neurofibromatosis     HPI  Pt presents today for neurofibromatosis; has had this condition over 27 years. He states that now some of the lesions on his back are starting to drain and itch. Is wondering if there is anything to stop the drainage/bleeding that he has noticed on the lower parts of his t-shirts.  Denies any rapid growth or pain in any of the neurofibromas.    Review of Systems   Constitutional: Negative for fever, chills, weight loss, weight gain, fatigue and malaise.   Skin: Positive for activity-related sunscreen use. Negative for daily sunscreen use and wears hat.   Hematologic/Lymphatic: Does not bruise/bleed easily.        Objective:    Physical Exam   Constitutional: He appears well-developed and well-nourished. No distress.   Neurological: He is alert and oriented to person, place, and time. He is not disoriented.   Psychiatric: He has a normal mood and affect.   Skin:   Areas Examined (abnormalities noted in diagram):   Head / Face Inspection Performed  Neck Inspection Performed  Chest / Axilla Inspection Performed  Abdomen Inspection Performed  Back Inspection Performed  RUE Inspected  LUE Inspection Performed                                                      Diagram Legend     Erythematous scaling macule/papule c/w actinic keratosis       Vascular papule c/w angioma      Pigmented verrucoid papule/plaque c/w seborrheic keratosis      Yellow umbilicated papule c/w sebaceous hyperplasia      Irregularly shaped tan macule c/w lentigo     1-2 mm smooth white papules consistent with Milia      Movable subcutaneous cyst with punctum c/w epidermal inclusion cyst      Subcutaneous movable cyst c/w pilar cyst      Firm pink to brown papule c/w dermatofibroma      Pedunculated fleshy papule(s) c/w skin tag(s)      Evenly pigmented macule c/w junctional nevus     Mildly  variegated pigmented, slightly irregular-bordered macule c/w mildly atypical nevus      Flesh colored to evenly pigmented papule c/w intradermal nevus       Pink pearly papule/plaque c/w basal cell carcinoma      Erythematous hyperkeratotic cursted plaque c/w SCC      Surgical scar with no sign of skin cancer recurrence      Open and closed comedones      Inflammatory papules and pustules      Verrucoid papule consistent consistent with wart     Erythematous eczematous patches and plaques     Dystrophic onycholytic nail with subungual debris c/w onychomycosis     Umbilicated papule    Erythematous-base heme-crusted tan verrucoid plaque consistent with inflamed seborrheic keratosis     Erythematous Silvery Scaling Plaque c/w Psoriasis     See annotation      Assessment / Plan:    Neoplasm of uncertain behavior of skin  -     Specimen to Pathology, Dermatology  As mentioned above, nothing was done to remove this lesion from his body - it came off on its own when he took his shirt off. Unsure where it originated, but it may have been the cause of the blood on the lower part of his t-shirt.  Pathology Orders:     Normal Orders This Visit    Specimen to Pathology, Dermatology     Comments:    Number of Specimens:->1  ------------------------->-------------------------  Spec 1 Procedure:->Biopsy  Spec 1 Clinical Impression:->r/o neurofibroma vs other  Spec 1 Source:->back    Questions:    Procedure Type:  Dermatology and skin neoplasms    Number of Specimens:  1    ------------------------:  -------------------------    Spec 1 Procedure:  Biopsy    Spec 1 Clinical Impression:  r/o neurofibroma vs other    Spec 1 Source:  back        Skin infection  -     clindamycin (CLEOCIN T) 1 % external solution; AAA bid  Dispense: 60 mL; Refill: 3  -     Aerobic culture    NF (neurofibromatosis)  Discussed importance of surveillance for development of malignant peripheral nerve sheath tumors.  Counseled pt to return to clinic if he  notices an increase in the growth rate, firmness, pain, or neurologic symptoms associated with any of his neurofibromas.    If he would like any larger ones removed, recommended going to gen surgery.     rtc 3 months for skin check or sooner for any concerns

## 2020-02-14 NOTE — LETTER
February 14, 2020      Raphael Catherine MD  [address]  [address]  [facility] [address]           [facility] - Dermatology  [address]  [place] [address]  Phone: [phone]  Fax: [phone]          Patient: Ishan Virgen   MR Number: [id number]   YOB: 1945   Date of Visit: 2/14/2020       Dear Dr. Raphael Catherine:    Thank you for referring Ishan Virgen to me for evaluation. Attached you will find relevant portions of my assessment and plan of care.    If you have questions, please do not hesitate to call me. I look forward to following Ishan Virgen along with you.    Sincerely,    Alejandrina Joyce MD    Enclosure  CC:  No Recipients    If you would like to receive this communication electronically, please contact [email or web address] or [phone] to request more information on Appy[employer]oo Link access.    For providers and/or their staff who would like to refer a patient to [facility], please contact us through our one-stop-shop provider referral line, [facility], at [phone].    If you feel you have received this communication in error or would no longer like to receive these types of communications, please e-mail [email or web address]

## 2020-02-19 LAB
BACTERIA SPEC AEROBE CULT: ABNORMAL
BACTERIA SPEC AEROBE CULT: ABNORMAL
FINAL PATHOLOGIC DIAGNOSIS: NORMAL
GROSS: NORMAL
MICROSCOPIC EXAM: NORMAL

## 2020-02-27 DIAGNOSIS — L08.9 SKIN INFECTION: ICD-10-CM

## 2020-02-27 NOTE — TELEPHONE ENCOUNTER
Spoke to pt brother Dr. Virgen who is in [place], he states that his brother is doing great and everything worked out fine. Informed pt to use medicine as needed only if he thinks another area may be infected. Pt brother understand.    JOHN

## 2020-02-27 NOTE — TELEPHONE ENCOUNTER
----- Message from Parris Kendrick sent at 2/27/2020  1:13 PM CST -----  Contact: Patient's brother Dr. Moulton  Reason: Patient says cream is working wonderful. Also would like to know if to call a refill in        Contact: [phone]

## 2020-02-28 RX ORDER — CLINDAMYCIN PHOSPHATE 11.9 MG/ML
SOLUTION TOPICAL
Qty: 60 ML | Refills: 3 | Status: SHIPPED | OUTPATIENT
Start: 2020-02-28

## 2020-02-28 NOTE — TELEPHONE ENCOUNTER
----- Message from Bandar Mock LPN sent at 2/27/2020  1:39 PM CST -----  Contact: Patient's brother Dr. Saige Obregon!  happy to hear this.    JT  ----- Message -----  From: Parris Kendrick  Sent: 2/27/2020   1:13 PM CST  To: [facility] Staff    Reason: Patient says cream is working wonderful. Also would like to know if to call a refill in        Contact: [phone]

## 2020-02-28 NOTE — TELEPHONE ENCOUNTER
Make sure pt knows he had 3 refills on the Rx, but I went ahead and [facility] just in case he already went through those.

## 2020-03-03 ENCOUNTER — PATIENT OUTREACH (OUTPATIENT)
Dept: ADMINISTRATIVE | Facility: OTHER | Age: 75
End: 2020-03-03

## 2020-03-20 ENCOUNTER — TELEPHONE (OUTPATIENT)
Dept: ADMINISTRATIVE | Facility: HOSPITAL | Age: 75
End: 2020-03-20

## 2020-03-20 NOTE — TELEPHONE ENCOUNTER
The patient was phoned about an upcoming appointment and the expectations of the pre-visit. The patient did not answer and I was able to leave a voice message.     Kamryn MARROQUIN LPN  Clinical Care Coordinator  Internal Medicine  [facility]  [phone]

## 2020-06-30 ENCOUNTER — TELEPHONE (OUTPATIENT)
Dept: NEUROLOGY | Facility: CLINIC | Age: 75
End: 2020-06-30

## 2020-06-30 NOTE — TELEPHONE ENCOUNTER
----- Message from Marilyn Greer sent at 6/30/2020  4:41 PM CDT -----  Contact: dr. mike Virgen is asking a call back from [facility] in regards to the pt       Contact info - [phone]

## 2020-07-01 ENCOUNTER — TELEPHONE (OUTPATIENT)
Dept: NEUROLOGY | Facility: CLINIC | Age: 75
End: 2020-07-01

## 2020-07-06 ENCOUNTER — PATIENT OUTREACH (OUTPATIENT)
Dept: ADMINISTRATIVE | Facility: OTHER | Age: 75
End: 2020-07-06

## 2020-07-07 ENCOUNTER — OFFICE VISIT (OUTPATIENT)
Dept: NEUROLOGY | Facility: CLINIC | Age: 75
End: 2020-07-07
Payer: MEDICARE

## 2020-07-07 VITALS
WEIGHT: 164 LBS | BODY MASS INDEX: 21.74 KG/M2 | SYSTOLIC BLOOD PRESSURE: 119 MMHG | HEART RATE: 92 BPM | HEIGHT: 73 IN | DIASTOLIC BLOOD PRESSURE: 60 MMHG

## 2020-07-07 DIAGNOSIS — F32.A DEPRESSION, UNSPECIFIED DEPRESSION TYPE: ICD-10-CM

## 2020-07-07 DIAGNOSIS — G30.1 LATE ONSET ALZHEIMER'S DISEASE WITH BEHAVIORAL DISTURBANCE: Primary | ICD-10-CM

## 2020-07-07 DIAGNOSIS — F02.818 LATE ONSET ALZHEIMER'S DISEASE WITH BEHAVIORAL DISTURBANCE: Primary | ICD-10-CM

## 2020-07-07 PROCEDURE — 99213 PR OFFICE/OUTPT VISIT, EST, LEVL III, 20-29 MIN: ICD-10-PCS | Mod: HCWC,S$GLB,, | Performed by: PSYCHIATRY & NEUROLOGY

## 2020-07-07 PROCEDURE — 99999 PR PBB SHADOW E&M-EST. PATIENT-LVL IV: CPT | Mod: PBBFAC,HCWC,, | Performed by: PSYCHIATRY & NEUROLOGY

## 2020-07-07 PROCEDURE — 1101F PT FALLS ASSESS-DOCD LE1/YR: CPT | Mod: HCWC,CPTII,S$GLB, | Performed by: PSYCHIATRY & NEUROLOGY

## 2020-07-07 PROCEDURE — 1126F PR PAIN SEVERITY QUANTIFIED, NO PAIN PRESENT: ICD-10-PCS | Mod: HCWC,S$GLB,, | Performed by: PSYCHIATRY & NEUROLOGY

## 2020-07-07 PROCEDURE — 1159F PR MEDICATION LIST DOCUMENTED IN MEDICAL RECORD: ICD-10-PCS | Mod: HCWC,S$GLB,, | Performed by: PSYCHIATRY & NEUROLOGY

## 2020-07-07 PROCEDURE — 1159F MED LIST DOCD IN RCRD: CPT | Mod: HCWC,S$GLB,, | Performed by: PSYCHIATRY & NEUROLOGY

## 2020-07-07 PROCEDURE — 1126F AMNT PAIN NOTED NONE PRSNT: CPT | Mod: HCWC,S$GLB,, | Performed by: PSYCHIATRY & NEUROLOGY

## 2020-07-07 PROCEDURE — 99213 OFFICE O/P EST LOW 20 MIN: CPT | Mod: HCWC,S$GLB,, | Performed by: PSYCHIATRY & NEUROLOGY

## 2020-07-07 PROCEDURE — 1101F PR PT FALLS ASSESS DOC 0-1 FALLS W/OUT INJ PAST YR: ICD-10-PCS | Mod: HCWC,CPTII,S$GLB, | Performed by: PSYCHIATRY & NEUROLOGY

## 2020-07-07 PROCEDURE — 99999 PR PBB SHADOW E&M-EST. PATIENT-LVL IV: ICD-10-PCS | Mod: PBBFAC,HCWC,, | Performed by: PSYCHIATRY & NEUROLOGY

## 2020-07-07 RX ORDER — ESCITALOPRAM OXALATE 10 MG/1
10 TABLET ORAL DAILY
Qty: 90 TABLET | Refills: 3 | Status: SHIPPED | OUTPATIENT
Start: 2020-07-07 | End: 2021-07-29

## 2020-07-07 NOTE — PROGRESS NOTES
Requested updates within Care Everywhere.  Patient's chart was reviewed for overdue LACY topics.  Immunizations reconciled.

## 2020-07-07 NOTE — LETTER
July 7, 2020      Raphael Catherine MD  [address]  [address]  [facility] [address]           [facility] - Neurology Epilepsy  [address]  [facility] [address]  Phone: [phone]  Fax: [phone]          Patient: Ishan Virgen   MR Number: [id number]   YOB: 1945   Date of Visit: 7/7/2020       Dear Dr. Raphael Catherine:    Thank you for referring Ishan Virgen to me for evaluation. Attached you will find relevant portions of my assessment and plan of care.    If you have questions, please do not hesitate to call me. I look forward to following Ishan Virgen along with you.    Sincerely,    Hal Boogie MD    Enclosure  CC:  No Recipients    If you would like to receive this communication electronically, please contact [email or web address] or [phone] to request more information on PartSimple Link access.    For providers and/or their staff who would like to refer a patient to [facility], please contact us through our one-stop-shop provider referral line, [facility], at [phone].    If you feel you have received this communication in error or would no longer like to receive these types of communications, please e-mail [email or web address]

## 2020-07-07 NOTE — PROGRESS NOTES
[facility] - NEUROLOGY  [facility] [place] [facility]    Date: July 7, 2020   Patient Name: Ishan Virgen   MRN: [id number]   PCP: Raphael Catherine  Referring Provider: Raphael Catherine MD    Assessment:      This is Ishan Virgen, 74 y.o. male with neurofibromatosis and Alzheimer type dementia, MRI brain images and memory labs reviewed     Plan:      -  Donepezil 10mg daily  -  Lexapro 10mg daily    Follow up 6 months       I discussed side effects of the medications. I asked the patient to  stop the medication if She notices serious adverse effects as we discussed and to seek immediate medical attention at an ER.     Hal Boogie MD  [facility]   Department of Neurology    Subjective:      Presents with friend from [employer], was unable to reach patient's brother by phone.  Generally doing well and unchanged, no longer leaving living facility unsupervised, enjoys singing in choir, talks to family on phone daily.  Friend is aware of some crying spells but unsure of frequency.  Possibly moving to [place] in the next few months.    HPI 11/2019:   Mr. Ishan Virgen is a 74 y.o. male who presents with a chief complaint of memory loss    Patient lives at [facility] assisted living facility since 2018 and presents with aid from his home due to concerns of worsening cognition.  Staff reports that he used to be able to ride the bus on his own but is no longer allowed to do this as he has difficulty knowing when and how to get off.  He also commonly confuses the days.  He remains independent in most ADLs but is not able to do his own laundry or prepare simple meals.    He formerly worked as an  for the [employer] but retired in March 2005.  He previously enjoyed participating in bible class and [employer] choir but does not do these activities any longer and is more with withdrawn, continues to attend [employer] services but spends much of his time watching TV.  He does  not have any children but does have 2 brother, one of whom visits regularly and calls frequently.    PAST MEDICAL HISTORY:  Past Medical History:   Diagnosis Date    Anxiety 10/5/2014    Arthritis 10/5/2014    Chronic back pain 11/26/2015    Elevated PSA 11/7/2012    Fatigue 11/7/2012    GERD (gastroesophageal reflux disease) 10/5/2014    Iron deficiency anemia, unspecified 11/7/2012    Neurofibromatosis, type 1 (von Recklinghausen's disease) 11/7/2012    Prostate cancer 11/7/2012    Slow transit constipation 4/24/2016       PAST SURGICAL HISTORY:  Past Surgical History:   Procedure Laterality Date    RECTAL POLYPECTOMY  2011       CURRENT MEDS:  Current Outpatient Medications   Medication Sig Dispense Refill    ascorbic acid, vitamin C, (VITAMIN C) 100 MG tablet Take 100 mg by mouth once daily.      aspirin (ECOTRIN) 81 MG EC tablet Take 81 mg by mouth once daily.      atorvastatin (LIPITOR) 40 MG tablet Take 1 tablet (40 mg total) by mouth once daily. 90 tablet 3    brimonidine 0.2% (ALPHAGAN) 0.2 % Drop INSTILL 1 DROP TWICE DAILY INTO LEFT EYE  12    carboxymethylcellulose sodium (ARTIFICIAL TEARS, CMC,) 1 % Drop Apply 1 drop to eye 4 (four) times daily. 15 mL 0    clindamycin (CLEOCIN T) 1 % external solution AAA bid 60 mL 3    cyanocobalamin, vitamin B-12, (VITAMIN B-12) 50 mcg tablet Take 50 mcg by mouth once daily.      donepezil (ARICEPT) 10 MG tablet Take 1 tablet (10 mg total) by mouth every evening. 90 tablet 3    dorzolamide-timolol 2-0.5% (COSOPT) 2-0.5 % ophthalmic solution 2 (two) times daily.   1    latanoprost 0.005 % ophthalmic solution 1 drop every evening.   0    vitamin D 1000 units Tab Take 185 mg by mouth once daily.      white petrolatum-mineral oil Oint Apply 1 drop to eye every evening. 3.5 g 0    escitalopram oxalate (LEXAPRO) 10 MG tablet Take 1 tablet (10 mg total) by mouth once daily. 90 tablet 3     No current facility-administered medications for this visit.   "      ALLERGIES:  Review of patient's allergies indicates:  No Known Allergies    FAMILY HISTORY:  Family History   Problem Relation Age of Onset    Lupus Neg Hx        SOCIAL HISTORY:  Social History     Tobacco Use    Smoking status: Never Smoker    Smokeless tobacco: Never Used   Substance Use Topics    Alcohol use: No    Drug use: Never       Review of Systems:  12 review of systems is negative except for the symptoms mentioned in HPI.        Objective:     Vitals:    07/07/20 1006   BP: 119/60   Pulse: 92   Weight: 74.4 kg (164 lb)   Height: 6' 1" (1.854 m)       General: NAD, well nourished   Eyes: no tearing, discharge, no erythema   ENT: moist mucous membranes of the oral cavity, nares patent    Neck: Supple, full range of motion  Cardiovascular: Warm and well perfused, pulses equal and symmetrical  Lungs: Normal work of breathing, normal chest wall excursions  Skin: No rash, lesions, or breakdown on exposed skin  Psychiatry: Mood and affect are appropriate   Abdomen: soft, non tender, non distended  Extremeties: No cyanosis, clubbing or edema.    Neurological   MENTAL STATUS: Calm and cooperative but with difficulty following directions during exam and fairly tangential during questioning  CRANIAL NERVES: Visual fields intact. PERRL. Right eye closed although left EOMI. Facial sensation intact. Face symmetrical. Hearing grossly intact. Full shoulder shrug bilaterally. Tongue protrudes midline   SENSORY: Sensation is intact to light touch throughout.     MOTOR: Normal bulk and tone. No pronator drift.  5/5 deltoid, biceps, triceps, interosseous, hand  bilaterally. 5/5 iliopsoas, knee extension/flexion, foot dorsi/plantarflexion bilaterally.    CEREBELLAR/COORDINATION/GAIT: Gait with shortened strike and clearance and mildly unsteady.          "

## 2022-01-12 ENCOUNTER — TELEPHONE (OUTPATIENT)
Dept: INTERNAL MEDICINE | Facility: CLINIC | Age: 77
End: 2022-01-12
Payer: MEDICARE

## 2022-01-12 NOTE — TELEPHONE ENCOUNTER
Spoke with Marcelo, brother and caregiver who informed me that he moved [place] a year ago to [place] after they confirmed he was suffering from dementia. Marcelo informed me that he is doing well in fact he had just left the doctors office with him today. I wished him well and safety. Conversation was understood and it ended.